# Patient Record
Sex: FEMALE | Race: BLACK OR AFRICAN AMERICAN | NOT HISPANIC OR LATINO | Employment: OTHER | URBAN - METROPOLITAN AREA
[De-identification: names, ages, dates, MRNs, and addresses within clinical notes are randomized per-mention and may not be internally consistent; named-entity substitution may affect disease eponyms.]

---

## 2023-04-20 LAB
ALANINE AMINOTRANSFERASE (SGPT) (U/L) IN SER/PLAS: 13 U/L (ref 7–45)
ALBUMIN (G/DL) IN SER/PLAS: 3.9 G/DL (ref 3.4–5)
ALKALINE PHOSPHATASE (U/L) IN SER/PLAS: 78 U/L (ref 33–136)
ANION GAP IN SER/PLAS: 9 MMOL/L (ref 10–20)
ASPARTATE AMINOTRANSFERASE (SGOT) (U/L) IN SER/PLAS: 11 U/L (ref 9–39)
BASOPHILS (10*3/UL) IN BLOOD BY AUTOMATED COUNT: 0.03 X10E9/L (ref 0–0.1)
BASOPHILS/100 LEUKOCYTES IN BLOOD BY AUTOMATED COUNT: 0.8 % (ref 0–2)
BILIRUBIN TOTAL (MG/DL) IN SER/PLAS: 0.5 MG/DL (ref 0–1.2)
C REACTIVE PROTEIN (MG/L) IN SER/PLAS: 0.62 MG/DL
CALCIUM (MG/DL) IN SER/PLAS: 9.9 MG/DL (ref 8.6–10.6)
CARBON DIOXIDE, TOTAL (MMOL/L) IN SER/PLAS: 28 MMOL/L (ref 21–32)
CHLORIDE (MMOL/L) IN SER/PLAS: 108 MMOL/L (ref 98–107)
CREATININE (MG/DL) IN SER/PLAS: 0.58 MG/DL (ref 0.5–1.05)
EOSINOPHILS (10*3/UL) IN BLOOD BY AUTOMATED COUNT: 0.18 X10E9/L (ref 0–0.7)
EOSINOPHILS/100 LEUKOCYTES IN BLOOD BY AUTOMATED COUNT: 4.8 % (ref 0–6)
ERYTHROCYTE DISTRIBUTION WIDTH (RATIO) BY AUTOMATED COUNT: 14.8 % (ref 11.5–14.5)
ERYTHROCYTE MEAN CORPUSCULAR HEMOGLOBIN CONCENTRATION (G/DL) BY AUTOMATED: 32.3 G/DL (ref 32–36)
ERYTHROCYTE MEAN CORPUSCULAR VOLUME (FL) BY AUTOMATED COUNT: 91 FL (ref 80–100)
ERYTHROCYTES (10*6/UL) IN BLOOD BY AUTOMATED COUNT: 4.78 X10E12/L (ref 4–5.2)
GFR FEMALE: >90 ML/MIN/1.73M2
GLUCOSE (MG/DL) IN SER/PLAS: 81 MG/DL (ref 74–99)
HEMATOCRIT (%) IN BLOOD BY AUTOMATED COUNT: 43.6 % (ref 36–46)
HEMOGLOBIN (G/DL) IN BLOOD: 14.1 G/DL (ref 12–16)
IMMATURE GRANULOCYTES/100 LEUKOCYTES IN BLOOD BY AUTOMATED COUNT: 0.3 % (ref 0–0.9)
LEUKOCYTES (10*3/UL) IN BLOOD BY AUTOMATED COUNT: 3.8 X10E9/L (ref 4.4–11.3)
LYMPHOCYTES (10*3/UL) IN BLOOD BY AUTOMATED COUNT: 1.13 X10E9/L (ref 1.2–4.8)
LYMPHOCYTES/100 LEUKOCYTES IN BLOOD BY AUTOMATED COUNT: 30.1 % (ref 13–44)
MONOCYTES (10*3/UL) IN BLOOD BY AUTOMATED COUNT: 0.36 X10E9/L (ref 0.1–1)
MONOCYTES/100 LEUKOCYTES IN BLOOD BY AUTOMATED COUNT: 9.6 % (ref 2–10)
NEUTROPHILS (10*3/UL) IN BLOOD BY AUTOMATED COUNT: 2.05 X10E9/L (ref 1.2–7.7)
NEUTROPHILS/100 LEUKOCYTES IN BLOOD BY AUTOMATED COUNT: 54.4 % (ref 40–80)
NRBC (PER 100 WBCS) BY AUTOMATED COUNT: 0 /100 WBC (ref 0–0)
PLATELETS (10*3/UL) IN BLOOD AUTOMATED COUNT: 221 X10E9/L (ref 150–450)
POTASSIUM (MMOL/L) IN SER/PLAS: 3.9 MMOL/L (ref 3.5–5.3)
PROTEIN TOTAL: 6.7 G/DL (ref 6.4–8.2)
SODIUM (MMOL/L) IN SER/PLAS: 141 MMOL/L (ref 136–145)
UREA NITROGEN (MG/DL) IN SER/PLAS: 13 MG/DL (ref 6–23)

## 2023-07-03 LAB
ALANINE AMINOTRANSFERASE (SGPT) (U/L) IN SER/PLAS: 9 U/L (ref 7–45)
ALBUMIN (G/DL) IN SER/PLAS: 3.9 G/DL (ref 3.4–5)
ALKALINE PHOSPHATASE (U/L) IN SER/PLAS: 68 U/L (ref 33–136)
ANION GAP IN SER/PLAS: 10 MMOL/L (ref 10–20)
ASPARTATE AMINOTRANSFERASE (SGOT) (U/L) IN SER/PLAS: 9 U/L (ref 9–39)
BASOPHILS (10*3/UL) IN BLOOD BY AUTOMATED COUNT: 0.03 X10E9/L (ref 0–0.1)
BASOPHILS/100 LEUKOCYTES IN BLOOD BY AUTOMATED COUNT: 0.8 % (ref 0–2)
BILIRUBIN TOTAL (MG/DL) IN SER/PLAS: 0.5 MG/DL (ref 0–1.2)
C REACTIVE PROTEIN (MG/L) IN SER/PLAS: 0.23 MG/DL
CALCIUM (MG/DL) IN SER/PLAS: 9.7 MG/DL (ref 8.6–10.6)
CARBON DIOXIDE, TOTAL (MMOL/L) IN SER/PLAS: 26 MMOL/L (ref 21–32)
CHLORIDE (MMOL/L) IN SER/PLAS: 110 MMOL/L (ref 98–107)
CREATININE (MG/DL) IN SER/PLAS: 0.58 MG/DL (ref 0.5–1.05)
EOSINOPHILS (10*3/UL) IN BLOOD BY AUTOMATED COUNT: 0.2 X10E9/L (ref 0–0.7)
EOSINOPHILS/100 LEUKOCYTES IN BLOOD BY AUTOMATED COUNT: 5.5 % (ref 0–6)
ERYTHROCYTE DISTRIBUTION WIDTH (RATIO) BY AUTOMATED COUNT: 14.2 % (ref 11.5–14.5)
ERYTHROCYTE MEAN CORPUSCULAR HEMOGLOBIN CONCENTRATION (G/DL) BY AUTOMATED: 32.5 G/DL (ref 32–36)
ERYTHROCYTE MEAN CORPUSCULAR VOLUME (FL) BY AUTOMATED COUNT: 90 FL (ref 80–100)
ERYTHROCYTES (10*6/UL) IN BLOOD BY AUTOMATED COUNT: 4.67 X10E12/L (ref 4–5.2)
GFR FEMALE: >90 ML/MIN/1.73M2
GLUCOSE (MG/DL) IN SER/PLAS: 88 MG/DL (ref 74–99)
HEMATOCRIT (%) IN BLOOD BY AUTOMATED COUNT: 42.2 % (ref 36–46)
HEMOGLOBIN (G/DL) IN BLOOD: 13.7 G/DL (ref 12–16)
IMMATURE GRANULOCYTES/100 LEUKOCYTES IN BLOOD BY AUTOMATED COUNT: 0.3 % (ref 0–0.9)
LEUKOCYTES (10*3/UL) IN BLOOD BY AUTOMATED COUNT: 3.6 X10E9/L (ref 4.4–11.3)
LYMPHOCYTES (10*3/UL) IN BLOOD BY AUTOMATED COUNT: 1.28 X10E9/L (ref 1.2–4.8)
LYMPHOCYTES/100 LEUKOCYTES IN BLOOD BY AUTOMATED COUNT: 35.2 % (ref 13–44)
MONOCYTES (10*3/UL) IN BLOOD BY AUTOMATED COUNT: 0.43 X10E9/L (ref 0.1–1)
MONOCYTES/100 LEUKOCYTES IN BLOOD BY AUTOMATED COUNT: 11.8 % (ref 2–10)
NEUTROPHILS (10*3/UL) IN BLOOD BY AUTOMATED COUNT: 1.69 X10E9/L (ref 1.2–7.7)
NEUTROPHILS/100 LEUKOCYTES IN BLOOD BY AUTOMATED COUNT: 46.4 % (ref 40–80)
NRBC (PER 100 WBCS) BY AUTOMATED COUNT: 0 /100 WBC (ref 0–0)
PLATELETS (10*3/UL) IN BLOOD AUTOMATED COUNT: 204 X10E9/L (ref 150–450)
POTASSIUM (MMOL/L) IN SER/PLAS: 3.8 MMOL/L (ref 3.5–5.3)
PROTEIN TOTAL: 6.6 G/DL (ref 6.4–8.2)
SODIUM (MMOL/L) IN SER/PLAS: 142 MMOL/L (ref 136–145)
UREA NITROGEN (MG/DL) IN SER/PLAS: 12 MG/DL (ref 6–23)

## 2023-07-15 LAB
CHOLESTEROL (MG/DL) IN SER/PLAS: 152 MG/DL (ref 0–199)
CHOLESTEROL IN HDL (MG/DL) IN SER/PLAS: 76.8 MG/DL
CHOLESTEROL/HDL RATIO: 2
LDL: 66 MG/DL (ref 0–99)
TRIGLYCERIDE (MG/DL) IN SER/PLAS: 48 MG/DL (ref 0–149)
VLDL: 10 MG/DL (ref 0–40)

## 2023-09-08 LAB
ALANINE AMINOTRANSFERASE (SGPT) (U/L) IN SER/PLAS: 10 U/L (ref 7–45)
ALBUMIN (G/DL) IN SER/PLAS: 3.8 G/DL (ref 3.4–5)
ALKALINE PHOSPHATASE (U/L) IN SER/PLAS: 65 U/L (ref 33–136)
ANION GAP IN SER/PLAS: 12 MMOL/L (ref 10–20)
ASPARTATE AMINOTRANSFERASE (SGOT) (U/L) IN SER/PLAS: 11 U/L (ref 9–39)
BASOPHILS (10*3/UL) IN BLOOD BY AUTOMATED COUNT: 0.04 X10E9/L (ref 0–0.1)
BASOPHILS/100 LEUKOCYTES IN BLOOD BY AUTOMATED COUNT: 1 % (ref 0–2)
BILIRUBIN TOTAL (MG/DL) IN SER/PLAS: 0.5 MG/DL (ref 0–1.2)
C REACTIVE PROTEIN (MG/L) IN SER/PLAS: 0.4 MG/DL
CALCIUM (MG/DL) IN SER/PLAS: 9.5 MG/DL (ref 8.6–10.6)
CARBON DIOXIDE, TOTAL (MMOL/L) IN SER/PLAS: 25 MMOL/L (ref 21–32)
CHLORIDE (MMOL/L) IN SER/PLAS: 109 MMOL/L (ref 98–107)
CREATININE (MG/DL) IN SER/PLAS: 0.52 MG/DL (ref 0.5–1.05)
EOSINOPHILS (10*3/UL) IN BLOOD BY AUTOMATED COUNT: 0.2 X10E9/L (ref 0–0.7)
EOSINOPHILS/100 LEUKOCYTES IN BLOOD BY AUTOMATED COUNT: 4.9 % (ref 0–6)
ERYTHROCYTE DISTRIBUTION WIDTH (RATIO) BY AUTOMATED COUNT: 14.5 % (ref 11.5–14.5)
ERYTHROCYTE MEAN CORPUSCULAR HEMOGLOBIN CONCENTRATION (G/DL) BY AUTOMATED: 32 G/DL (ref 32–36)
ERYTHROCYTE MEAN CORPUSCULAR VOLUME (FL) BY AUTOMATED COUNT: 90 FL (ref 80–100)
ERYTHROCYTES (10*6/UL) IN BLOOD BY AUTOMATED COUNT: 4.82 X10E12/L (ref 4–5.2)
GFR FEMALE: >90 ML/MIN/1.73M2
GLUCOSE (MG/DL) IN SER/PLAS: 80 MG/DL (ref 74–99)
HEMATOCRIT (%) IN BLOOD BY AUTOMATED COUNT: 43.5 % (ref 36–46)
HEMOGLOBIN (G/DL) IN BLOOD: 13.9 G/DL (ref 12–16)
IMMATURE GRANULOCYTES/100 LEUKOCYTES IN BLOOD BY AUTOMATED COUNT: 0.2 % (ref 0–0.9)
LEUKOCYTES (10*3/UL) IN BLOOD BY AUTOMATED COUNT: 4.1 X10E9/L (ref 4.4–11.3)
LYMPHOCYTES (10*3/UL) IN BLOOD BY AUTOMATED COUNT: 1.1 X10E9/L (ref 1.2–4.8)
LYMPHOCYTES/100 LEUKOCYTES IN BLOOD BY AUTOMATED COUNT: 27.1 % (ref 13–44)
MONOCYTES (10*3/UL) IN BLOOD BY AUTOMATED COUNT: 0.41 X10E9/L (ref 0.1–1)
MONOCYTES/100 LEUKOCYTES IN BLOOD BY AUTOMATED COUNT: 10.1 % (ref 2–10)
NEUTROPHILS (10*3/UL) IN BLOOD BY AUTOMATED COUNT: 2.3 X10E9/L (ref 1.2–7.7)
NEUTROPHILS/100 LEUKOCYTES IN BLOOD BY AUTOMATED COUNT: 56.7 % (ref 40–80)
NRBC (PER 100 WBCS) BY AUTOMATED COUNT: 0 /100 WBC (ref 0–0)
PLATELETS (10*3/UL) IN BLOOD AUTOMATED COUNT: 219 X10E9/L (ref 150–450)
POTASSIUM (MMOL/L) IN SER/PLAS: 4 MMOL/L (ref 3.5–5.3)
PROTEIN TOTAL: 6.5 G/DL (ref 6.4–8.2)
SODIUM (MMOL/L) IN SER/PLAS: 142 MMOL/L (ref 136–145)
UREA NITROGEN (MG/DL) IN SER/PLAS: 14 MG/DL (ref 6–23)

## 2023-11-20 ENCOUNTER — TELEPHONE (OUTPATIENT)
Dept: RHEUMATOLOGY | Facility: CLINIC | Age: 68
End: 2023-11-20
Payer: MEDICARE

## 2023-11-20 NOTE — TELEPHONE ENCOUNTER
Patient states she is having side effects to the Xeljanz and hasn't taken the medication in two weeks. She states she wasn't feeling well on the medication. Please call her at [phone].

## 2023-11-22 NOTE — TELEPHONE ENCOUNTER
Called patient a couple doses after taking the samples of Xelijenz 11mg for her to twice weekly onset of headaches at night initial potential side effect from Xelijenz? Along with intermittent medium dull muscles pains in arms and hands. Then feeling off balance.   She was still get the headaches and they later progressed to constant while she was taking. During the time frame when she was off Xeljanz no headaches and other complaints went away.     She stopped the xeljenz 2 weeks ago then on her own resume it yesterday. Today she woke up this morning with a headache.  Feels like the headaches/intermittent muscle arm/pains are about the same still. Denies n/v/d/gerd. Denies stiff neck/jaw pain. Denies ulcers and no rashes presently. She did have a rash previously when she was taking prednisone 5 days out of the week she endorsed.   Denies s/s stroke; heart attack. Paged Dr. Valverde. She is stop xelijenz and continue to the prednisone 5mg for 2 days out of the week. Discuss further at upcoming fuv in December.

## 2023-12-01 ENCOUNTER — LAB (OUTPATIENT)
Dept: LAB | Facility: LAB | Age: 68
End: 2023-12-01
Payer: MEDICARE

## 2023-12-01 DIAGNOSIS — M35.1 OTHER OVERLAP SYNDROMES (MULTI): Primary | ICD-10-CM

## 2023-12-01 LAB
ALBUMIN SERPL BCP-MCNC: 4 G/DL (ref 3.4–5)
ALP SERPL-CCNC: 65 U/L (ref 33–136)
ALT SERPL W P-5'-P-CCNC: 13 U/L (ref 7–45)
ANION GAP SERPL CALC-SCNC: 11 MMOL/L (ref 10–20)
AST SERPL W P-5'-P-CCNC: 11 U/L (ref 9–39)
BASOPHILS # BLD AUTO: 0.04 X10*3/UL (ref 0–0.1)
BASOPHILS NFR BLD AUTO: 1 %
BILIRUB SERPL-MCNC: 0.6 MG/DL (ref 0–1.2)
BUN SERPL-MCNC: 13 MG/DL (ref 6–23)
CALCIUM SERPL-MCNC: 9.8 MG/DL (ref 8.6–10.6)
CHLORIDE SERPL-SCNC: 106 MMOL/L (ref 98–107)
CO2 SERPL-SCNC: 27 MMOL/L (ref 21–32)
CREAT SERPL-MCNC: 0.55 MG/DL (ref 0.5–1.05)
CRP SERPL-MCNC: 0.65 MG/DL
EOSINOPHIL # BLD AUTO: 0.18 X10*3/UL (ref 0–0.7)
EOSINOPHIL NFR BLD AUTO: 4.3 %
ERYTHROCYTE [DISTWIDTH] IN BLOOD BY AUTOMATED COUNT: 14.6 % (ref 11.5–14.5)
GFR SERPL CREATININE-BSD FRML MDRD: >90 ML/MIN/1.73M*2
GLUCOSE SERPL-MCNC: 79 MG/DL (ref 74–99)
HCT VFR BLD AUTO: 42.5 % (ref 36–46)
HGB BLD-MCNC: 13.9 G/DL (ref 12–16)
IMM GRANULOCYTES # BLD AUTO: 0.01 X10*3/UL (ref 0–0.7)
IMM GRANULOCYTES NFR BLD AUTO: 0.2 % (ref 0–0.9)
LYMPHOCYTES # BLD AUTO: 1.2 X10*3/UL (ref 1.2–4.8)
LYMPHOCYTES NFR BLD AUTO: 28.8 %
MCH RBC QN AUTO: 29.8 PG (ref 26–34)
MCHC RBC AUTO-ENTMCNC: 32.7 G/DL (ref 32–36)
MCV RBC AUTO: 91 FL (ref 80–100)
MONOCYTES # BLD AUTO: 0.53 X10*3/UL (ref 0.1–1)
MONOCYTES NFR BLD AUTO: 12.7 %
NEUTROPHILS # BLD AUTO: 2.21 X10*3/UL (ref 1.2–7.7)
NEUTROPHILS NFR BLD AUTO: 53 %
NRBC BLD-RTO: 0 /100 WBCS (ref 0–0)
PLATELET # BLD AUTO: 244 X10*3/UL (ref 150–450)
POTASSIUM SERPL-SCNC: 4 MMOL/L (ref 3.5–5.3)
PROT SERPL-MCNC: 6.8 G/DL (ref 6.4–8.2)
RBC # BLD AUTO: 4.66 X10*6/UL (ref 4–5.2)
SODIUM SERPL-SCNC: 140 MMOL/L (ref 136–145)
WBC # BLD AUTO: 4.2 X10*3/UL (ref 4.4–11.3)

## 2023-12-01 PROCEDURE — 80053 COMPREHEN METABOLIC PANEL: CPT

## 2023-12-01 PROCEDURE — 85025 COMPLETE CBC W/AUTO DIFF WBC: CPT

## 2023-12-01 PROCEDURE — 86140 C-REACTIVE PROTEIN: CPT

## 2023-12-01 PROCEDURE — 36415 COLL VENOUS BLD VENIPUNCTURE: CPT

## 2023-12-14 ENCOUNTER — OFFICE VISIT (OUTPATIENT)
Dept: RHEUMATOLOGY | Facility: CLINIC | Age: 68
End: 2023-12-14
Payer: MEDICARE

## 2023-12-14 VITALS
HEIGHT: 67 IN | SYSTOLIC BLOOD PRESSURE: 121 MMHG | BODY MASS INDEX: 21.19 KG/M2 | HEART RATE: 65 BPM | DIASTOLIC BLOOD PRESSURE: 77 MMHG | WEIGHT: 135 LBS

## 2023-12-14 DIAGNOSIS — M06.049 RHEUMATOID ARTHRITIS INVOLVING HAND WITH NEGATIVE RHEUMATOID FACTOR, UNSPECIFIED LATERALITY (MULTI): Primary | ICD-10-CM

## 2023-12-14 PROCEDURE — 1126F AMNT PAIN NOTED NONE PRSNT: CPT | Performed by: INTERNAL MEDICINE

## 2023-12-14 PROCEDURE — 99213 OFFICE O/P EST LOW 20 MIN: CPT | Performed by: INTERNAL MEDICINE

## 2023-12-14 PROCEDURE — 1159F MED LIST DOCD IN RCRD: CPT | Performed by: INTERNAL MEDICINE

## 2023-12-14 RX ORDER — ACETAMINOPHEN 500 MG
1 TABLET ORAL EVERY 24 HOURS
COMMUNITY

## 2023-12-14 RX ORDER — PREDNISONE 5 MG/1
5 TABLET ORAL 2 TIMES DAILY
COMMUNITY
End: 2024-03-21 | Stop reason: SDUPTHER

## 2023-12-14 RX ORDER — CHOLECALCIFEROL (VITAMIN D3) 25 MCG
1000 TABLET ORAL DAILY
COMMUNITY

## 2023-12-14 RX ORDER — CARBOXYMETHYLCELLULOSE SODIUM 10 MG/ML
2 GEL OPHTHALMIC 3 TIMES DAILY PRN
COMMUNITY

## 2023-12-14 RX ORDER — ATORVASTATIN CALCIUM 10 MG/1
1 TABLET, FILM COATED ORAL DAILY
COMMUNITY
Start: 2022-08-08

## 2023-12-14 ASSESSMENT — ENCOUNTER SYMPTOMS
OCCASIONAL FEELINGS OF UNSTEADINESS: 0
LOSS OF SENSATION IN FEET: 0
DEPRESSION: 0

## 2023-12-14 ASSESSMENT — PAIN SCALES - GENERAL: PAINLEVEL: 0-NO PAIN

## 2023-12-15 NOTE — PROGRESS NOTES
Informants: Patient and EMR.  PP: 68-year-old female with history of mixed connective tissue disease, rheumatoid arthritis.  CC: Mild pain in hands.  [place]: She initially presented to rheumatology department 4/9/2012 with complaints of Raynaud's phenomena with positive GUILLERMINA and positive RNP.  She had a history of retinal degeneration.  She developed cramping pain in her hands that improved with physical therapy.  She was noted to have no signs of a scleroderma or joint swelling.  She gradually developed swelling with limited range of motion of the MCP joint of the digits of both hands as well as both wrists.  She was unable to tolerate leflunomide, minocycline, rinvoq, mycophenolate.  She was also treated with Orencia that seemed to cause exacerbation of the joint pain.  She was most recently treated with Xeljanz 5 mg twice per week in addition to prednisone 2 days/week.  She noted headache palpitations and abdominal discomfort with taking Xeljanz 11 mg once or twice per week.  She has not taken the Xeljanz for about past 1 month with resolution of headache and palpitation symptoms.  PH: Allergies: Celebrex (angioedema), naproxen (wheezing), intolerance: Fosamax (bone pain in the shins), methotrexate, Orencia, hydroxychloroquine; illnesses: Osteopenia, retinal degeneration, mixed connective tissue disease, rheumatoid arthritis.  SH: She denies any alcohol tobacco or illicit drug use.  FH: Her father had colon cancer and myocardial infarction.  Her mother has coronary artery disease.  She has a brother with retinal degeneration and neuropathy involving his face.  She has a sister with diverticulitis and rheumatoid arthritis.  She has no children.  PX: She is a thin, well-developed, well-nourished, black female.  The lungs, heart, abdomen, and extremities are benign.  The musculoskeletal examination shows slight skin tightness of the digits of both hands.  There is slight flexion contracture of the MCP joint of the  second and third digits of both hands with mild synovial thickening.  There is mild synovial thickening at the wrist with some limitation to range of motion of both wrist.  The elbows and shoulders are not swollen.  The knees are not swollen.  Laboratory (12/1/2023) CRP 0.65, WBC 4.2, hemoglobin 13.9, hematocrit 42.5, MCV 91, MCHC 32.7, platelets 244, BUN 13, creatinine 0.55, glucose 79, alkaline phosphatase 65, AST 11, ALT 13, (2/23/2012) GUILLERMINA 1: 320, RNP 2.2.  Impression: 68-year-old black female with mixed connective tissue disease with rheumatoid arthritis and mild crest features.  Plan: She is to continue with her current medications Prednisone 5 mg 2 days/week.  Try to resume Xeljanz at 5 mg twice per week.  She is to return at the next available office appointment.

## 2024-01-10 ENCOUNTER — TELEPHONE (OUTPATIENT)
Dept: RHEUMATOLOGY | Facility: CLINIC | Age: 69
End: 2024-01-10

## 2024-01-10 NOTE — PROGRESS NOTES
Per recent fuv. Pt to resume xeljanx 5mg BID. Sending to [facility] for PA/PAP.

## 2024-01-12 NOTE — TELEPHONE ENCOUNTER
Spoke to Dr. Valverde and his note is correct will have patient take Xeljanz 5mg twice per week. Sent messaging to rheumatology pharmacy team of clarification as working to get approval for PAP program. Updated pt and will call her back to confirm that samples are at the [place] office for her. Pt stated still approx 1,000/month with goodrx since she didn't get qualify for PAP last year.

## 2024-01-18 NOTE — TELEPHONE ENCOUNTER
Patient left a VM indicating that she was supposed to hear from Amira today. Wants return call today.

## 2024-02-08 ENCOUNTER — OFFICE VISIT (OUTPATIENT)
Dept: OPHTHALMOLOGY | Facility: CLINIC | Age: 69
End: 2024-02-08
Payer: MEDICARE

## 2024-02-08 DIAGNOSIS — H40.9 GLAUCOMA OF BOTH EYES, UNSPECIFIED GLAUCOMA TYPE: Primary | ICD-10-CM

## 2024-02-08 DIAGNOSIS — H33.312 HORSESHOE TEAR OF RETINA OF LEFT EYE WITHOUT DETACHMENT: ICD-10-CM

## 2024-02-08 DIAGNOSIS — H04.129 DRY EYE: ICD-10-CM

## 2024-02-08 DIAGNOSIS — H40.1131 PRIMARY OPEN ANGLE GLAUCOMA (POAG) OF BOTH EYES, MILD STAGE: ICD-10-CM

## 2024-02-08 DIAGNOSIS — H30.143 ACUTE POSTERIOR MULTIFOCAL PLACOID PIGMENT EPITHELIOPATHY, BILATERAL: ICD-10-CM

## 2024-02-08 DIAGNOSIS — H25.13 NUCLEAR SCLEROSIS OF BOTH EYES: ICD-10-CM

## 2024-02-08 PROCEDURE — 92083 EXTENDED VISUAL FIELD XM: CPT | Performed by: OPHTHALMOLOGY

## 2024-02-08 PROCEDURE — 92134 CPTRZ OPH DX IMG PST SGM RTA: CPT | Mod: BILATERAL PROCEDURE | Performed by: OPHTHALMOLOGY

## 2024-02-08 PROCEDURE — 99213 OFFICE O/P EST LOW 20 MIN: CPT | Performed by: OPHTHALMOLOGY

## 2024-02-08 ASSESSMENT — CONF VISUAL FIELD
OD_SUPERIOR_NASAL_RESTRICTION: 0
OS_SUPERIOR_NASAL_RESTRICTION: 2
OD_INFERIOR_TEMPORAL_RESTRICTION: 0
OS_SUPERIOR_TEMPORAL_RESTRICTION: 2
OD_NORMAL: 1
OS_INFERIOR_TEMPORAL_RESTRICTION: 2
OD_INFERIOR_NASAL_RESTRICTION: 0
OD_SUPERIOR_TEMPORAL_RESTRICTION: 0
OS_INFERIOR_NASAL_RESTRICTION: 2

## 2024-02-08 ASSESSMENT — VISUAL ACUITY
OD_CC: 20/20
METHOD: SNELLEN - LINEAR

## 2024-02-08 ASSESSMENT — EXTERNAL EXAM - LEFT EYE: OS_EXAM: NORMAL

## 2024-02-08 ASSESSMENT — ENCOUNTER SYMPTOMS: EYES NEGATIVE: 1

## 2024-02-08 ASSESSMENT — SLIT LAMP EXAM - LIDS
COMMENTS: GOOD POSITION
COMMENTS: GOOD POSITION

## 2024-02-08 ASSESSMENT — TONOMETRY
IOP_METHOD: GOLDMANN APPLANATION
OD_IOP_MMHG: 14
OS_IOP_MMHG: 14

## 2024-02-08 ASSESSMENT — PACHYMETRY
OS_CT(UM): 597
OD_CT(UM): 582

## 2024-02-08 ASSESSMENT — EXTERNAL EXAM - RIGHT EYE: OD_EXAM: NORMAL

## 2024-02-08 NOTE — PROGRESS NOTES
1-oag intraocular pressure (IOP) good, but oct changes. Start latanoprost ou samples given  2-ns ou  3-polo tears

## 2024-02-22 ENCOUNTER — LAB (OUTPATIENT)
Dept: LAB | Facility: LAB | Age: 69
End: 2024-02-22
Payer: MEDICARE

## 2024-02-22 DIAGNOSIS — M35.1 OTHER OVERLAP SYNDROMES (MULTI): Primary | ICD-10-CM

## 2024-02-22 LAB
ALBUMIN SERPL BCP-MCNC: 3.9 G/DL (ref 3.4–5)
ALP SERPL-CCNC: 64 U/L (ref 33–136)
ALT SERPL W P-5'-P-CCNC: 11 U/L (ref 7–45)
ANION GAP SERPL CALC-SCNC: 12 MMOL/L (ref 10–20)
AST SERPL W P-5'-P-CCNC: 8 U/L (ref 9–39)
BASOPHILS # BLD AUTO: 0.04 X10*3/UL (ref 0–0.1)
BASOPHILS NFR BLD AUTO: 1.3 %
BILIRUB SERPL-MCNC: 0.5 MG/DL (ref 0–1.2)
BUN SERPL-MCNC: 11 MG/DL (ref 6–23)
CALCIUM SERPL-MCNC: 10 MG/DL (ref 8.6–10.6)
CHLORIDE SERPL-SCNC: 107 MMOL/L (ref 98–107)
CO2 SERPL-SCNC: 26 MMOL/L (ref 21–32)
CREAT SERPL-MCNC: 0.62 MG/DL (ref 0.5–1.05)
CRP SERPL-MCNC: 0.28 MG/DL
EGFRCR SERPLBLD CKD-EPI 2021: >90 ML/MIN/1.73M*2
EOSINOPHIL # BLD AUTO: 0.2 X10*3/UL (ref 0–0.7)
EOSINOPHIL NFR BLD AUTO: 6.4 %
ERYTHROCYTE [DISTWIDTH] IN BLOOD BY AUTOMATED COUNT: 14.1 % (ref 11.5–14.5)
GLUCOSE SERPL-MCNC: 82 MG/DL (ref 74–99)
HCT VFR BLD AUTO: 43.8 % (ref 36–46)
HGB BLD-MCNC: 14.1 G/DL (ref 12–16)
IMM GRANULOCYTES # BLD AUTO: 0.01 X10*3/UL (ref 0–0.7)
IMM GRANULOCYTES NFR BLD AUTO: 0.3 % (ref 0–0.9)
LYMPHOCYTES # BLD AUTO: 0.99 X10*3/UL (ref 1.2–4.8)
LYMPHOCYTES NFR BLD AUTO: 31.8 %
MCH RBC QN AUTO: 29.1 PG (ref 26–34)
MCHC RBC AUTO-ENTMCNC: 32.2 G/DL (ref 32–36)
MCV RBC AUTO: 91 FL (ref 80–100)
MONOCYTES # BLD AUTO: 0.4 X10*3/UL (ref 0.1–1)
MONOCYTES NFR BLD AUTO: 12.9 %
NEUTROPHILS # BLD AUTO: 1.47 X10*3/UL (ref 1.2–7.7)
NEUTROPHILS NFR BLD AUTO: 47.3 %
NRBC BLD-RTO: 0 /100 WBCS (ref 0–0)
PLATELET # BLD AUTO: 215 X10*3/UL (ref 150–450)
POTASSIUM SERPL-SCNC: 4.2 MMOL/L (ref 3.5–5.3)
PROT SERPL-MCNC: 6.6 G/DL (ref 6.4–8.2)
RBC # BLD AUTO: 4.84 X10*6/UL (ref 4–5.2)
SODIUM SERPL-SCNC: 141 MMOL/L (ref 136–145)
WBC # BLD AUTO: 3.1 X10*3/UL (ref 4.4–11.3)

## 2024-02-22 PROCEDURE — 85025 COMPLETE CBC W/AUTO DIFF WBC: CPT

## 2024-02-22 PROCEDURE — 80053 COMPREHEN METABOLIC PANEL: CPT

## 2024-02-22 PROCEDURE — 86140 C-REACTIVE PROTEIN: CPT

## 2024-02-22 PROCEDURE — 36415 COLL VENOUS BLD VENIPUNCTURE: CPT

## 2024-03-21 ENCOUNTER — OFFICE VISIT (OUTPATIENT)
Dept: RHEUMATOLOGY | Facility: CLINIC | Age: 69
End: 2024-03-21
Payer: MEDICARE

## 2024-03-21 VITALS
DIASTOLIC BLOOD PRESSURE: 66 MMHG | SYSTOLIC BLOOD PRESSURE: 117 MMHG | HEIGHT: 67 IN | HEART RATE: 78 BPM | BODY MASS INDEX: 20.72 KG/M2 | WEIGHT: 132 LBS

## 2024-03-21 DIAGNOSIS — K86.2 PANCREATIC CYST (HHS-HCC): ICD-10-CM

## 2024-03-21 DIAGNOSIS — M06.049 RHEUMATOID ARTHRITIS INVOLVING HAND WITH NEGATIVE RHEUMATOID FACTOR, UNSPECIFIED LATERALITY (MULTI): Primary | ICD-10-CM

## 2024-03-21 PROCEDURE — 1126F AMNT PAIN NOTED NONE PRSNT: CPT | Performed by: INTERNAL MEDICINE

## 2024-03-21 PROCEDURE — 99214 OFFICE O/P EST MOD 30 MIN: CPT | Performed by: INTERNAL MEDICINE

## 2024-03-21 PROCEDURE — 1160F RVW MEDS BY RX/DR IN RCRD: CPT | Performed by: INTERNAL MEDICINE

## 2024-03-21 PROCEDURE — 1036F TOBACCO NON-USER: CPT | Performed by: INTERNAL MEDICINE

## 2024-03-21 PROCEDURE — 1159F MED LIST DOCD IN RCRD: CPT | Performed by: INTERNAL MEDICINE

## 2024-03-21 RX ORDER — PREDNISONE 5 MG/1
TABLET ORAL
Qty: 24 TABLET | Refills: 3 | Status: SHIPPED | OUTPATIENT
Start: 2024-03-21

## 2024-03-21 ASSESSMENT — PAIN SCALES - GENERAL: PAINLEVEL: 0-NO PAIN

## 2024-03-21 NOTE — PROGRESS NOTES
She presents for follow-up evaluation with some mild soreness hands.  She continues to take prednisone 5 mg 2 days/week and Xeljanz 5 mg 2 days/week.  She has not noted any significant stiffness.    The lungs are clear to auscultation.  The heart is regular in rhythm.  The abdomen is benign.  Extremities are without edema.  The musculoskeletal examination shows mild synovial thickening at the PIP and MCP joints of the digits of both hands with flexion contracture of the DIP joint of fifth digit of both hands.  There is mild pain on passive abduction of the shoulders.  The knees are not swollen.  There is dorsal interosseous muscle atrophy in the radial aspect of both hands, left worse than right.  The Tinel's sign is normal at both wrists.    DEXA bone density (9/14/2022) L1-L4 T-score -2.9, left femoral neck T-score -2.1, left total femur T-score -1.8, right femoral neck T-score -2.3, right total femur T-score -1.9.    Laboratory (2/22/2024) CRP 0.28, WBC 3.1, hemoglobin 14.1, hematocrit 43.8, MCV 91, MCHC 32.2, platelets 215, absolute neutrophil count 1.47, absolute lymphocyte count 0.99 BUN 11, creatinine 0.62, glucose 82, calcium 10.0, Albumin 3.9, alkaline phosphatase 69, AST 8, ALT 11.    She has rheumatoid arthritis, mixed connective tissue disease with crest features, osteoporosis, mild leukopenia likely related to medication (Xeljanz).    She is to have laboratory for follow-up of Xeljanz therapy.  She is to stop Xeljanz for 1 week prior to the next laboratory testing.  She is to have ultrasound of the abdomen to follow-up on the cyst in tail of the pancreas.  She is to return at the next available office appointment.

## 2024-04-01 ENCOUNTER — HOSPITAL ENCOUNTER (OUTPATIENT)
Dept: RADIOLOGY | Facility: CLINIC | Age: 69
Discharge: HOME | End: 2024-04-01
Payer: MEDICARE

## 2024-04-01 DIAGNOSIS — K86.2 PANCREATIC CYST (HHS-HCC): ICD-10-CM

## 2024-04-01 PROCEDURE — 76705 ECHO EXAM OF ABDOMEN: CPT

## 2024-04-01 PROCEDURE — 76705 ECHO EXAM OF ABDOMEN: CPT | Performed by: STUDENT IN AN ORGANIZED HEALTH CARE EDUCATION/TRAINING PROGRAM

## 2024-04-18 ENCOUNTER — OFFICE VISIT (OUTPATIENT)
Dept: OPHTHALMOLOGY | Facility: CLINIC | Age: 69
End: 2024-04-18
Payer: MEDICARE

## 2024-04-18 DIAGNOSIS — H40.003 GLAUCOMA SUSPECT OF BOTH EYES: Primary | ICD-10-CM

## 2024-04-18 PROCEDURE — 99212 OFFICE O/P EST SF 10 MIN: CPT | Performed by: OPHTHALMOLOGY

## 2024-04-18 RX ORDER — OMEGA-3-ACID ETHYL ESTERS 1 G/1
1 CAPSULE, LIQUID FILLED ORAL 2 TIMES DAILY
COMMUNITY

## 2024-04-18 ASSESSMENT — REFRACTION_WEARINGRX
OD_CYLINDER: -0.50
OD_AXIS: 152
OD_SPHERE: -7.75
OS_SPHERE: -6.00
OS_CYLINDER: -1.50

## 2024-04-18 ASSESSMENT — CONF VISUAL FIELD
OD_INFERIOR_TEMPORAL_RESTRICTION: 0
OS_SUPERIOR_TEMPORAL_RESTRICTION: 2
OS_INFERIOR_NASAL_RESTRICTION: 2
OD_INFERIOR_NASAL_RESTRICTION: 0
OD_NORMAL: 1
OS_SUPERIOR_NASAL_RESTRICTION: 2
OD_SUPERIOR_NASAL_RESTRICTION: 0
OS_INFERIOR_TEMPORAL_RESTRICTION: 2
OD_SUPERIOR_TEMPORAL_RESTRICTION: 0

## 2024-04-18 ASSESSMENT — ENCOUNTER SYMPTOMS
PSYCHIATRIC NEGATIVE: 0
HEMATOLOGIC/LYMPHATIC NEGATIVE: 0
ALLERGIC/IMMUNOLOGIC NEGATIVE: 0
GASTROINTESTINAL NEGATIVE: 0
RESPIRATORY NEGATIVE: 0
ENDOCRINE NEGATIVE: 0
EYES NEGATIVE: 0
CARDIOVASCULAR NEGATIVE: 0
CONSTITUTIONAL NEGATIVE: 0
NEUROLOGICAL NEGATIVE: 0
MUSCULOSKELETAL NEGATIVE: 0

## 2024-04-18 ASSESSMENT — EXTERNAL EXAM - LEFT EYE: OS_EXAM: NORMAL

## 2024-04-18 ASSESSMENT — VISUAL ACUITY
OD_CC: 20/20
CORRECTION_TYPE: GLASSES
METHOD: SNELLEN - LINEAR
OS_CC: 20/30

## 2024-04-18 ASSESSMENT — TONOMETRY
OS_IOP_MMHG: 12
IOP_METHOD: GOLDMANN APPLANATION
OD_IOP_MMHG: 12

## 2024-04-18 ASSESSMENT — EXTERNAL EXAM - RIGHT EYE: OD_EXAM: NORMAL

## 2024-04-18 ASSESSMENT — SLIT LAMP EXAM - LIDS
COMMENTS: GOOD POSITION
COMMENTS: GOOD POSITION

## 2024-04-18 ASSESSMENT — PACHYMETRY
OS_CT(UM): 597
OD_CT(UM): 582

## 2024-05-24 ENCOUNTER — LAB (OUTPATIENT)
Dept: LAB | Facility: LAB | Age: 69
End: 2024-05-24
Payer: MEDICARE

## 2024-05-24 DIAGNOSIS — M06.049 RHEUMATOID ARTHRITIS INVOLVING HAND WITH NEGATIVE RHEUMATOID FACTOR, UNSPECIFIED LATERALITY (MULTI): ICD-10-CM

## 2024-05-24 LAB
ALBUMIN SERPL BCP-MCNC: 4 G/DL (ref 3.4–5)
ALP SERPL-CCNC: 59 U/L (ref 33–136)
ALT SERPL W P-5'-P-CCNC: 11 U/L (ref 7–45)
ANION GAP SERPL CALC-SCNC: 11 MMOL/L (ref 10–20)
AST SERPL W P-5'-P-CCNC: 11 U/L (ref 9–39)
BASOPHILS # BLD AUTO: 0.05 X10*3/UL (ref 0–0.1)
BASOPHILS NFR BLD AUTO: 1.1 %
BILIRUB SERPL-MCNC: 0.6 MG/DL (ref 0–1.2)
BUN SERPL-MCNC: 12 MG/DL (ref 6–23)
CALCIUM SERPL-MCNC: 9.3 MG/DL (ref 8.6–10.6)
CHLORIDE SERPL-SCNC: 108 MMOL/L (ref 98–107)
CO2 SERPL-SCNC: 25 MMOL/L (ref 21–32)
CREAT SERPL-MCNC: 0.55 MG/DL (ref 0.5–1.05)
CRP SERPL-MCNC: 0.34 MG/DL
EGFRCR SERPLBLD CKD-EPI 2021: >90 ML/MIN/1.73M*2
EOSINOPHIL # BLD AUTO: 0.22 X10*3/UL (ref 0–0.7)
EOSINOPHIL NFR BLD AUTO: 4.8 %
ERYTHROCYTE [DISTWIDTH] IN BLOOD BY AUTOMATED COUNT: 14.4 % (ref 11.5–14.5)
GLUCOSE SERPL-MCNC: 88 MG/DL (ref 74–99)
HCT VFR BLD AUTO: 43.9 % (ref 36–46)
HGB BLD-MCNC: 13.9 G/DL (ref 12–16)
IMM GRANULOCYTES # BLD AUTO: 0.01 X10*3/UL (ref 0–0.7)
IMM GRANULOCYTES NFR BLD AUTO: 0.2 % (ref 0–0.9)
LYMPHOCYTES # BLD AUTO: 1.01 X10*3/UL (ref 1.2–4.8)
LYMPHOCYTES NFR BLD AUTO: 22.1 %
MCH RBC QN AUTO: 28.8 PG (ref 26–34)
MCHC RBC AUTO-ENTMCNC: 31.7 G/DL (ref 32–36)
MCV RBC AUTO: 91 FL (ref 80–100)
MONOCYTES # BLD AUTO: 0.48 X10*3/UL (ref 0.1–1)
MONOCYTES NFR BLD AUTO: 10.5 %
NEUTROPHILS # BLD AUTO: 2.8 X10*3/UL (ref 1.2–7.7)
NEUTROPHILS NFR BLD AUTO: 61.3 %
NRBC BLD-RTO: 0 /100 WBCS (ref 0–0)
PLATELET # BLD AUTO: 214 X10*3/UL (ref 150–450)
POTASSIUM SERPL-SCNC: 3.8 MMOL/L (ref 3.5–5.3)
PROT SERPL-MCNC: 6.6 G/DL (ref 6.4–8.2)
RBC # BLD AUTO: 4.83 X10*6/UL (ref 4–5.2)
SODIUM SERPL-SCNC: 140 MMOL/L (ref 136–145)
WBC # BLD AUTO: 4.6 X10*3/UL (ref 4.4–11.3)

## 2024-05-24 PROCEDURE — 36415 COLL VENOUS BLD VENIPUNCTURE: CPT

## 2024-05-24 PROCEDURE — 80053 COMPREHEN METABOLIC PANEL: CPT

## 2024-05-24 PROCEDURE — 86140 C-REACTIVE PROTEIN: CPT

## 2024-05-24 PROCEDURE — 85025 COMPLETE CBC W/AUTO DIFF WBC: CPT

## 2024-07-16 ENCOUNTER — LAB (OUTPATIENT)
Dept: LAB | Facility: LAB | Age: 69
End: 2024-07-16
Payer: MEDICARE

## 2024-07-16 ENCOUNTER — OFFICE VISIT (OUTPATIENT)
Dept: CARDIOLOGY | Facility: HOSPITAL | Age: 69
End: 2024-07-16
Payer: MEDICARE

## 2024-07-16 VITALS
BODY MASS INDEX: 19.88 KG/M2 | HEART RATE: 61 BPM | SYSTOLIC BLOOD PRESSURE: 117 MMHG | DIASTOLIC BLOOD PRESSURE: 75 MMHG | WEIGHT: 131.2 LBS | HEIGHT: 68 IN

## 2024-07-16 DIAGNOSIS — E78.00 PURE HYPERCHOLESTEROLEMIA: ICD-10-CM

## 2024-07-16 LAB
CHOLEST SERPL-MCNC: 153 MG/DL (ref 0–199)
CHOLESTEROL/HDL RATIO: 1.8
HDLC SERPL-MCNC: 82.9 MG/DL
LDLC SERPL CALC-MCNC: 60 MG/DL
NON HDL CHOLESTEROL: 70 MG/DL (ref 0–149)
TRIGL SERPL-MCNC: 51 MG/DL (ref 0–149)
VLDL: 10 MG/DL (ref 0–40)

## 2024-07-16 PROCEDURE — 80061 LIPID PANEL: CPT

## 2024-07-16 PROCEDURE — 1160F RVW MEDS BY RX/DR IN RCRD: CPT | Performed by: INTERNAL MEDICINE

## 2024-07-16 PROCEDURE — 93010 ELECTROCARDIOGRAM REPORT: CPT | Performed by: INTERNAL MEDICINE

## 2024-07-16 PROCEDURE — 93005 ELECTROCARDIOGRAM TRACING: CPT | Performed by: INTERNAL MEDICINE

## 2024-07-16 PROCEDURE — 99214 OFFICE O/P EST MOD 30 MIN: CPT | Mod: 25 | Performed by: INTERNAL MEDICINE

## 2024-07-16 PROCEDURE — 36415 COLL VENOUS BLD VENIPUNCTURE: CPT

## 2024-07-16 PROCEDURE — 99214 OFFICE O/P EST MOD 30 MIN: CPT | Performed by: INTERNAL MEDICINE

## 2024-07-16 PROCEDURE — 1159F MED LIST DOCD IN RCRD: CPT | Performed by: INTERNAL MEDICINE

## 2024-07-16 PROCEDURE — 1036F TOBACCO NON-USER: CPT | Performed by: INTERNAL MEDICINE

## 2024-07-16 RX ORDER — HYPROMELLOSE 0 G/G
1 GEL OPHTHALMIC AS NEEDED
COMMUNITY

## 2024-07-16 ASSESSMENT — PATIENT HEALTH QUESTIONNAIRE - PHQ9
1. LITTLE INTEREST OR PLEASURE IN DOING THINGS: NOT AT ALL
SUM OF ALL RESPONSES TO PHQ9 QUESTIONS 1 & 2: 0
2. FEELING DOWN, DEPRESSED OR HOPELESS: NOT AT ALL

## 2024-07-16 ASSESSMENT — ENCOUNTER SYMPTOMS
DEPRESSION: 0
OCCASIONAL FEELINGS OF UNSTEADINESS: 0
LOSS OF SENSATION IN FEET: 0

## 2024-07-16 NOTE — PROGRESS NOTES
Primary Care Physician: Danny Fraser MD  Date of Visit: 07/16/2024 11:00 AM EDT  Location of visit: [facility]     Chief Complaint:   Chief Complaint   Patient presents with    Follow-up     1 year        HPI / Summary:   Trang Ramos is a 69 y.o. female presents for followup.  She has no cardiac complaints.  She is generally active and participates in chair yoga and lalo chi without chest pain or shortness of breath.  The patient denies chest pain, shortness of breath, palpitations, lightheadedness, syncope, orthopnea, paroxysmal nocturnal dyspnea, lower extremity edema, or bleeding problems.    She never started taking atorvastatin secondary to the concern for side effects.  She has never been on a statin.          Past Medical History:   Diagnosis Date    Dry eyes     Glaucoma suspect of both eyes     Nuclear sclerotic cataract of right eye     Personal history of other diseases of the musculoskeletal system and connective tissue 10/28/2013    History of connective tissue disease    Pseudophakia     Retinal detachment 01/01/2012    Retinal tear OS        Past Surgical History:   Procedure Laterality Date    CATARACT EXTRACTION      PC IOL OU    OTHER SURGICAL HISTORY  04/22/2019    Colonoscopy    RETINAL DETACHMENT SURGERY Left 01/01/2012    Retinal tear OS in 2012          Social History:   reports that she has never smoked. She has been exposed to tobacco smoke. She has never used smokeless tobacco. She reports that she does not drink alcohol and does not use drugs.     Family History:  family history includes No Known Problems in her father and mother.      Allergies:  Allergies   Allergen Reactions    Celecoxib Anaphylaxis and Swelling    Alendronic Acid GI Upset, Itching and Other     leg pain    Codeine GI Upset    Naproxen Swelling    Sulfa (Sulfonamide Antibiotics) Swelling    Leflunomide Dizziness    Hydroxychloroquine Rash    Minocycline Rash and Swelling       Outpatient Medications:  Current  "Outpatient Medications   Medication Instructions    atorvastatin (Lipitor) 10 mg tablet 1 tablet, oral, Daily    calcium carbonate-vitamin D3 (Caltrate with Vitamin D3) 600 mg-20 mcg (800 unit) tablet 1 tablet, oral, Every 24 hours    carboxymethylcellulose (Refresh Celluvisc) 1 % ophthalmic solution dropperette 2 drops, Both Eyes, 3 times daily PRN    cholecalciferol (VITAMIN D-3) 1,000 Units, oral, Daily    hypromellose (GenTeal Tears Severe Gel) 0.3 % opthalmic gel 1 drop, Both Eyes, As needed, Uses Mostly at night     multivitamin-Ca-iron-minerals (Tab-A-Jair Womens) 27-0.4 mg tablet 1 tablet, Daily    omega-3 acid ethyl esters (LOVAZA) 1 g, oral, 2 times daily    predniSONE (Deltasone) 5 mg tablet 1 tablet orally twice per week.       Physical Exam:  Vitals:    07/16/24 1103   BP: 117/75   BP Location: Left arm   Patient Position: Sitting   BP Cuff Size: Adult   Pulse: 61   Weight: 59.5 kg (131 lb 3.2 oz)   Height: 1.715 m (5' 7.5\")     Wt Readings from Last 5 Encounters:   07/16/24 59.5 kg (131 lb 3.2 oz)   03/21/24 59.9 kg (132 lb)   12/14/23 61.2 kg (135 lb)   07/11/23 60.8 kg (134 lb)   05/18/23 60.1 kg (132 lb 9.6 oz)     Body mass index is 20.25 kg/m².   General: Well-developed well-nourished in no acute distress  HEENT: Normocephalic atraumatic  Neck: Supple, JVP is normal negative hepatojugular reflux 2+ carotid pulses without bruit  Pulmonary: Normal respiratory effort, clear to auscultation  Cardiovascular: No right ventricular heave, normal S1 and S2, no murmurs rubs or gallops  Abdomen: Soft nontender nondistended  Extremities: Warm without edema 2+ radial pulses bilaterally 2+ posterior tibial pulses bilaterally  Neurologic: Alert and oriented x3  Psychiatric: Normal mood and affect     Last Labs:  CMP:  Recent Labs     05/24/24  0843 02/22/24  0906 12/01/23  0915    141 140   K 3.8 4.2 4.0   * 107 106   CO2 25 26 27   ANIONGAP 11 12 11   BUN 12 11 13   CREATININE 0.55 0.62 0.55   EGFR " ">90 >90 >90   GLUCOSE 88 82 79     Recent Labs     05/24/24  0843 02/22/24  0906 12/01/23  0915   ALBUMIN 4.0 3.9 4.0   ALKPHOS 59 64 65   ALT 11 11 13   AST 11 8* 11   BILITOT 0.6 0.5 0.6     CBC:  Recent Labs     05/24/24  0843 02/22/24  0906 12/01/23  0915   WBC 4.6 3.1* 4.2*   HGB 13.9 14.1 13.9   HCT 43.9 43.8 42.5    215 244   MCV 91 91 91     COAG: No results for input(s): \"INR\", \"DDIMERVTE\" in the last 95556 hours.  HEME/ENDO:  Recent Labs     08/30/18  1757   TSH 1.24      CARDIAC: No results for input(s): \"LDH\", \"CKMB\", \"TROPHS\", \"BNP\" in the last 72006 hours.    No lab exists for component: \"CK\", \"CKMBP\"  Recent Labs     07/15/23  1000 07/05/22  0932   CHOL 152 154   LDLF 66 63   HDL 76.8 81.8   TRIG 48 46       Last Cardiology Tests:  ECG:  An electrocardiogram performed today that I reviewed shows normal sinus rhythm with sinus arrhythmia.    Echo:  Echocardiogram March 13, 2019  CONCLUSIONS:   1. The left ventricular systolic function is normal with a 60-65% estimated ejection fraction.       Cath:      Stress Test:  Stress Results:  No results found for this or any previous visit from the past 365 days.         Cardiac Imaging:  CT chest without contrast July 31, 2019  HEART AND VESSELS:  The thoracic aorta is of normal course and caliber without vascular  calcifications.     Main pulmonary artery and its branches are normal in caliber.     No coronary artery calcifications are seen. The study is not  optimized for evaluation of coronary arteries.     The cardiac chambers are not enlarged.     No evidence of pericardial effusion.      Assessment/Plan   Diagnoses and all orders for this visit:  Pure hypercholesterolemia  -     ECG 12 Lead  -     Lipid panel; Future  -     CT cardiac scoring wo IV contrast; Future    In summary Ms. Ramos is a pleasant 69 year-old -American female with a past medical history significant for rheumatoid arthritis and hyperlipidemia.  She is asymptomatic from " a cardiac perspective.  Given her history of rheumatoid arthritis I did recommend a statin.  He is hesitant because she is concerned about side.  I did order a CT cardiac scoring further risk stratification.  I also ordered a fasting profile.  We will see her back in follow-up in 1 year.      Orders:  Orders Placed This Encounter   Procedures    CT cardiac scoring wo IV contrast    Lipid panel    ECG 12 Lead      Followup Appts:  Future Appointments   Date Time Provider Department Center   8/8/2024  2:00 PM Victoriano Valverde MD [id number] Academic   1/9/2025  9:30 AM Steve Mac MD [id number] Academic           ____________________________________________________________  Maurice Sahni MD  [facility] [place]  [facility]

## 2024-07-16 NOTE — PATIENT INSTRUCTIONS
Check CT cardiac scoring.  Check a fasting lipid profile.  Consider taking a cholesterol medication.  Follow-up in 1 year.

## 2024-07-17 LAB
ATRIAL RATE: 61 BPM
P AXIS: 62 DEGREES
P OFFSET: 211 MS
P ONSET: 153 MS
PR INTERVAL: 142 MS
Q ONSET: 224 MS
QRS COUNT: 10 BEATS
QRS DURATION: 82 MS
QT INTERVAL: 410 MS
QTC CALCULATION(BAZETT): 412 MS
QTC FREDERICIA: 412 MS
R AXIS: 42 DEGREES
T AXIS: 43 DEGREES
T OFFSET: 429 MS
VENTRICULAR RATE: 61 BPM

## 2024-07-19 ENCOUNTER — TELEPHONE (OUTPATIENT)
Dept: CARDIOLOGY | Facility: HOSPITAL | Age: 69
End: 2024-07-19
Payer: MEDICARE

## 2024-07-19 NOTE — TELEPHONE ENCOUNTER
Notified pt. Of lab results.  Pt. Hasn't been called to schedule CT cardiac score yet, but understands statin therapy will be decided based on that result.       ----- Message from Maurice Sahni sent at 7/16/2024  5:56 PM EDT -----  Lipid profile is okay.  Await CT cardiac scoring to determine if she should start statin therapy.

## 2024-07-31 ENCOUNTER — LAB (OUTPATIENT)
Dept: LAB | Facility: LAB | Age: 69
End: 2024-07-31
Payer: MEDICARE

## 2024-07-31 DIAGNOSIS — M06.049 RHEUMATOID ARTHRITIS INVOLVING HAND WITH NEGATIVE RHEUMATOID FACTOR, UNSPECIFIED LATERALITY (MULTI): ICD-10-CM

## 2024-07-31 LAB
ALBUMIN SERPL BCP-MCNC: 4 G/DL (ref 3.4–5)
ALP SERPL-CCNC: 63 U/L (ref 33–136)
ALT SERPL W P-5'-P-CCNC: 12 U/L (ref 7–45)
ANION GAP SERPL CALC-SCNC: 11 MMOL/L (ref 10–20)
AST SERPL W P-5'-P-CCNC: 11 U/L (ref 9–39)
BASOPHILS # BLD AUTO: 0.03 X10*3/UL (ref 0–0.1)
BASOPHILS NFR BLD AUTO: 0.9 %
BILIRUB SERPL-MCNC: 0.6 MG/DL (ref 0–1.2)
BUN SERPL-MCNC: 14 MG/DL (ref 6–23)
CALCIUM SERPL-MCNC: 9.8 MG/DL (ref 8.6–10.6)
CHLORIDE SERPL-SCNC: 107 MMOL/L (ref 98–107)
CO2 SERPL-SCNC: 26 MMOL/L (ref 21–32)
CREAT SERPL-MCNC: 0.6 MG/DL (ref 0.5–1.05)
CRP SERPL-MCNC: 0.19 MG/DL
EGFRCR SERPLBLD CKD-EPI 2021: >90 ML/MIN/1.73M*2
EOSINOPHIL # BLD AUTO: 0.17 X10*3/UL (ref 0–0.7)
EOSINOPHIL NFR BLD AUTO: 5.1 %
ERYTHROCYTE [DISTWIDTH] IN BLOOD BY AUTOMATED COUNT: 14.5 % (ref 11.5–14.5)
GLUCOSE SERPL-MCNC: 94 MG/DL (ref 74–99)
HCT VFR BLD AUTO: 42.5 % (ref 36–46)
HGB BLD-MCNC: 14 G/DL (ref 12–16)
IMM GRANULOCYTES # BLD AUTO: 0.01 X10*3/UL (ref 0–0.7)
IMM GRANULOCYTES NFR BLD AUTO: 0.3 % (ref 0–0.9)
LYMPHOCYTES # BLD AUTO: 1.03 X10*3/UL (ref 1.2–4.8)
LYMPHOCYTES NFR BLD AUTO: 31 %
MCH RBC QN AUTO: 29.3 PG (ref 26–34)
MCHC RBC AUTO-ENTMCNC: 32.9 G/DL (ref 32–36)
MCV RBC AUTO: 89 FL (ref 80–100)
MONOCYTES # BLD AUTO: 0.42 X10*3/UL (ref 0.1–1)
MONOCYTES NFR BLD AUTO: 12.7 %
NEUTROPHILS # BLD AUTO: 1.66 X10*3/UL (ref 1.2–7.7)
NEUTROPHILS NFR BLD AUTO: 50 %
NRBC BLD-RTO: 0 /100 WBCS (ref 0–0)
PLATELET # BLD AUTO: 241 X10*3/UL (ref 150–450)
POTASSIUM SERPL-SCNC: 4 MMOL/L (ref 3.5–5.3)
PROT SERPL-MCNC: 6.3 G/DL (ref 6.4–8.2)
RBC # BLD AUTO: 4.78 X10*6/UL (ref 4–5.2)
SODIUM SERPL-SCNC: 140 MMOL/L (ref 136–145)
WBC # BLD AUTO: 3.3 X10*3/UL (ref 4.4–11.3)

## 2024-07-31 PROCEDURE — 86140 C-REACTIVE PROTEIN: CPT

## 2024-07-31 PROCEDURE — 36415 COLL VENOUS BLD VENIPUNCTURE: CPT

## 2024-07-31 PROCEDURE — 80053 COMPREHEN METABOLIC PANEL: CPT

## 2024-07-31 PROCEDURE — 85025 COMPLETE CBC W/AUTO DIFF WBC: CPT

## 2024-08-08 ENCOUNTER — APPOINTMENT (OUTPATIENT)
Dept: RHEUMATOLOGY | Facility: CLINIC | Age: 69
End: 2024-08-08
Payer: MEDICARE

## 2024-08-08 VITALS
WEIGHT: 131 LBS | SYSTOLIC BLOOD PRESSURE: 109 MMHG | HEIGHT: 68 IN | HEART RATE: 71 BPM | DIASTOLIC BLOOD PRESSURE: 73 MMHG | TEMPERATURE: 98.2 F | BODY MASS INDEX: 19.85 KG/M2

## 2024-08-08 DIAGNOSIS — M06.9 RHEUMATOID ARTHRITIS INVOLVING MULTIPLE JOINTS (MULTI): Primary | ICD-10-CM

## 2024-08-08 PROCEDURE — 3008F BODY MASS INDEX DOCD: CPT | Performed by: INTERNAL MEDICINE

## 2024-08-08 PROCEDURE — 1160F RVW MEDS BY RX/DR IN RCRD: CPT | Performed by: INTERNAL MEDICINE

## 2024-08-08 PROCEDURE — 99213 OFFICE O/P EST LOW 20 MIN: CPT | Performed by: INTERNAL MEDICINE

## 2024-08-08 PROCEDURE — 1036F TOBACCO NON-USER: CPT | Performed by: INTERNAL MEDICINE

## 2024-08-08 PROCEDURE — 1159F MED LIST DOCD IN RCRD: CPT | Performed by: INTERNAL MEDICINE

## 2024-08-08 PROCEDURE — 1126F AMNT PAIN NOTED NONE PRSNT: CPT | Performed by: INTERNAL MEDICINE

## 2024-08-08 ASSESSMENT — PAIN SCALES - GENERAL: PAINLEVEL: 0-NO PAIN

## 2024-10-01 ENCOUNTER — LAB (OUTPATIENT)
Dept: LAB | Facility: LAB | Age: 69
End: 2024-10-01
Payer: MEDICARE

## 2024-10-01 DIAGNOSIS — M06.049 RHEUMATOID ARTHRITIS INVOLVING HAND WITH NEGATIVE RHEUMATOID FACTOR, UNSPECIFIED LATERALITY (MULTI): ICD-10-CM

## 2024-10-01 LAB
ALBUMIN SERPL BCP-MCNC: 3.9 G/DL (ref 3.4–5)
ALP SERPL-CCNC: 75 U/L (ref 33–136)
ALT SERPL W P-5'-P-CCNC: 9 U/L (ref 7–45)
ANION GAP SERPL CALC-SCNC: 11 MMOL/L (ref 10–20)
AST SERPL W P-5'-P-CCNC: 12 U/L (ref 9–39)
BASOPHILS # BLD AUTO: 0.03 X10*3/UL (ref 0–0.1)
BASOPHILS NFR BLD AUTO: 0.9 %
BILIRUB SERPL-MCNC: 0.4 MG/DL (ref 0–1.2)
BUN SERPL-MCNC: 13 MG/DL (ref 6–23)
CALCIUM SERPL-MCNC: 9.9 MG/DL (ref 8.6–10.6)
CHLORIDE SERPL-SCNC: 109 MMOL/L (ref 98–107)
CO2 SERPL-SCNC: 26 MMOL/L (ref 21–32)
CREAT SERPL-MCNC: 0.56 MG/DL (ref 0.5–1.05)
CRP SERPL-MCNC: 0.2 MG/DL
EGFRCR SERPLBLD CKD-EPI 2021: >90 ML/MIN/1.73M*2
EOSINOPHIL # BLD AUTO: 0.18 X10*3/UL (ref 0–0.7)
EOSINOPHIL NFR BLD AUTO: 5.2 %
ERYTHROCYTE [DISTWIDTH] IN BLOOD BY AUTOMATED COUNT: 14.3 % (ref 11.5–14.5)
GLUCOSE SERPL-MCNC: 88 MG/DL (ref 74–99)
HCT VFR BLD AUTO: 44.5 % (ref 36–46)
HGB BLD-MCNC: 14.6 G/DL (ref 12–16)
IMM GRANULOCYTES # BLD AUTO: 0 X10*3/UL (ref 0–0.7)
IMM GRANULOCYTES NFR BLD AUTO: 0 % (ref 0–0.9)
LYMPHOCYTES # BLD AUTO: 1.09 X10*3/UL (ref 1.2–4.8)
LYMPHOCYTES NFR BLD AUTO: 31.4 %
MCH RBC QN AUTO: 29.7 PG (ref 26–34)
MCHC RBC AUTO-ENTMCNC: 32.8 G/DL (ref 32–36)
MCV RBC AUTO: 91 FL (ref 80–100)
MONOCYTES # BLD AUTO: 0.33 X10*3/UL (ref 0.1–1)
MONOCYTES NFR BLD AUTO: 9.5 %
NEUTROPHILS # BLD AUTO: 1.84 X10*3/UL (ref 1.2–7.7)
NEUTROPHILS NFR BLD AUTO: 53 %
NRBC BLD-RTO: 0 /100 WBCS (ref 0–0)
PLATELET # BLD AUTO: 209 X10*3/UL (ref 150–450)
POTASSIUM SERPL-SCNC: 4 MMOL/L (ref 3.5–5.3)
PROT SERPL-MCNC: 6.7 G/DL (ref 6.4–8.2)
RBC # BLD AUTO: 4.91 X10*6/UL (ref 4–5.2)
SODIUM SERPL-SCNC: 142 MMOL/L (ref 136–145)
WBC # BLD AUTO: 3.5 X10*3/UL (ref 4.4–11.3)

## 2024-10-01 PROCEDURE — 86140 C-REACTIVE PROTEIN: CPT

## 2024-10-01 PROCEDURE — 36415 COLL VENOUS BLD VENIPUNCTURE: CPT

## 2024-10-01 PROCEDURE — 80053 COMPREHEN METABOLIC PANEL: CPT

## 2024-10-01 PROCEDURE — 85025 COMPLETE CBC W/AUTO DIFF WBC: CPT

## 2024-10-16 ENCOUNTER — HOSPITAL ENCOUNTER (OUTPATIENT)
Dept: RADIOLOGY | Facility: HOSPITAL | Age: 69
Discharge: HOME | End: 2024-10-16
Payer: MEDICARE

## 2024-10-16 DIAGNOSIS — E78.00 PURE HYPERCHOLESTEROLEMIA: ICD-10-CM

## 2024-10-16 PROCEDURE — 75571 CT HRT W/O DYE W/CA TEST: CPT

## 2024-11-04 ENCOUNTER — OFFICE VISIT (OUTPATIENT)
Dept: GASTROENTEROLOGY | Facility: HOSPITAL | Age: 69
End: 2024-11-04
Payer: MEDICARE

## 2024-11-04 VITALS
HEIGHT: 68 IN | BODY MASS INDEX: 20.31 KG/M2 | TEMPERATURE: 97.7 F | SYSTOLIC BLOOD PRESSURE: 120 MMHG | HEART RATE: 69 BPM | WEIGHT: 134 LBS | DIASTOLIC BLOOD PRESSURE: 81 MMHG | OXYGEN SATURATION: 97 %

## 2024-11-04 DIAGNOSIS — Z80.0 FAMILY HISTORY OF COLON CANCER IN FATHER: Primary | ICD-10-CM

## 2024-11-04 DIAGNOSIS — K86.2 PANCREATIC CYST (HHS-HCC): ICD-10-CM

## 2024-11-04 DIAGNOSIS — D18.03 LIVER HEMANGIOMA: ICD-10-CM

## 2024-11-04 DIAGNOSIS — R05.3 CHRONIC COUGH: ICD-10-CM

## 2024-11-04 PROCEDURE — 3008F BODY MASS INDEX DOCD: CPT | Performed by: INTERNAL MEDICINE

## 2024-11-04 PROCEDURE — 1160F RVW MEDS BY RX/DR IN RCRD: CPT | Performed by: INTERNAL MEDICINE

## 2024-11-04 PROCEDURE — 99215 OFFICE O/P EST HI 40 MIN: CPT | Performed by: INTERNAL MEDICINE

## 2024-11-04 PROCEDURE — 99205 OFFICE O/P NEW HI 60 MIN: CPT | Performed by: INTERNAL MEDICINE

## 2024-11-04 PROCEDURE — 1159F MED LIST DOCD IN RCRD: CPT | Performed by: INTERNAL MEDICINE

## 2024-11-04 PROCEDURE — 1036F TOBACCO NON-USER: CPT | Performed by: INTERNAL MEDICINE

## 2024-11-04 PROCEDURE — 1126F AMNT PAIN NOTED NONE PRSNT: CPT | Performed by: INTERNAL MEDICINE

## 2024-11-04 RX ORDER — POLYETHYLENE GLYCOL 3350, SODIUM CHLORIDE, SODIUM BICARBONATE, POTASSIUM CHLORIDE 420; 11.2; 5.72; 1.48 G/4L; G/4L; G/4L; G/4L
4000 POWDER, FOR SOLUTION ORAL ONCE
Qty: 4000 ML | Refills: 0 | Status: SHIPPED | OUTPATIENT
Start: 2024-11-04 | End: 2024-11-04

## 2024-11-04 ASSESSMENT — PAIN SCALES - GENERAL: PAINLEVEL_OUTOF10: 0-NO PAIN

## 2024-11-04 NOTE — PROGRESS NOTES
Subjective   Patient ID:   Ms. 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 Danny Fraser - [facility] for “screening for colorectal cancer”.     The patient was last seen by me 10/14/19 (see note from that date for full details) for chronic cough and left globus sensation, secondary to non-acid laryngopharyngeal reflux based on 24-hour pH/impedance study 6/19/19. She declined surgical referral at that time as PPI and H2-blockers are not effective for this condition. She subsequently noted that her cough resolved. She only notes the cough when she is in air-conditioning environment such as her [employer]. Given her side-branch IPMN, she elected to pursue a repeat MRI of her pancreas with MRCP which was done 1/22/20, which revealed a stable 1.3 x 0.5 cm side-branch IPMN in the pancreatic tail. A repeat MRI was recommended in one year, but she did not follow up. Her rheumatologist ordered an ultrasound instead of an MRI 4/1/24 for follow up of the IPMN, but the pancreas was obscured by shadowing bowel gas. The radiologist recommended MRCP, but this did not get done. Due to her family history of colon cancer in her father, she agreed to undergo a high-risk colonoscopy which was  done 11/25/19 and revealed diverticulosis in the sigmoid and ascending colon and external hemorrhoids. A repeat high-risk screening colonoscopy was recommended in 5 years.  She would like to get this done here.     She denies any current symptoms. She denies any abdominal pain, anorexia, changes in bowel habits, hematochezia, melena, nausea, vomiting, hematemesis, dysphagia, or weight loss.    She was also noted to have a hypodense lesion in segment 6/7 of the liver measuring 2.3 x 2.5 cm which could not be characterized on the chest CT from 3/7/19, and the radiologist recommended further evaluation with liver ultrasound. In review of her scanned F records, the patient was previously seen by Dr. Roverto Bustamante - Hepatology 10/16/12 for a similar finding of a 3 cm right liver lesion on a chest CT 6/28/12. His note indicates that a subsequent liver ultrasound and liver MRI demonstrated hemangiomas. A tiny 6 mm side-branch pancreatic IPMN was also noted. A repeat MRI was recommended for 6 months to ensure stability of the hemangiomas. However, the patient states she did not have this done to her recollection. She did not even recall having the history of liver lesions in the past, having an ultrasound, or even having an MRI of the liver done before.      After discussion with her at her initial visit 4/14/19, she opted to pursue a repeat MRI of the liver. Her insurance would not cover MRI with contrast.  An MRI of the liver 6/12/19 revealed a liver hemangioma, and a 1.3 cm cystic lesion in the tail of the pancreas with communication to the main pancreatic duct consistent with side-branch pancreatic IPMN.     She reports she had normal colonoscopies done at age 49 (at [facility] - no records scanned into [facility] or available in  Physician Portal) and at age 59 (at [facility] - no records scanned with [facility] records in [facility]). Attempts to obtain these records have been  unsuccessful.     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   Lives with father and serves as his caretaker   No tobacco   No Etoh   No recreational or IVDA     FH:   F alive age 98 with CAD s/p MI, HTN, hyperlipidemia, history of diverticulosis, history of colon cancer diagnosed at age 85 or 86 requiring surgery (done at [facility]; no chemoradiation therapy needed after surgery)   M  age 95 from undefined heart problems; she had CAD, HTN, hyperlipidemia   S alive age 72 with rheumatoid arthritis and history of diverticulosis with diverticulitis requiring partial colon resection   B alive age 67 with history of retinal tear   No children   No other family members with colon polyps or colon cancer   No FH of IBD, PUD, liver disease, or pancreatic disease, to her knowledge    Objective   Physical Exam  Constitutional:       Appearance: Normal appearance.   HENT:      Mouth/Throat:      Mouth: Mucous membranes are moist.   Eyes:      Conjunctiva/sclera: Conjunctivae normal.   Cardiovascular:      Rate and Rhythm: Normal rate and regular rhythm.   Pulmonary:      Effort: Pulmonary effort is normal.      Breath sounds: Normal breath sounds.   Abdominal:      General: Abdomen is flat. Bowel sounds are normal. There is no distension.      Palpations: Abdomen is soft. There is no mass.      Tenderness: There is no abdominal tenderness. There is no guarding or rebound.      Hernia: No hernia is present.   Skin:     General: Skin is warm.   Neurological:      Mental Status: She is oriented to person, place, and time.   Psychiatric:         Mood and Affect: Mood normal.         Assessment/Plan     High-risk colorectal cancer screening due to father’s history of colon cancer. As previously noted, she is due for repeat high risk screening colonoscopy now. I again discussed the  various options, risks, benefits, and advantages and disadvantages of each option, including doing nothing, with the patient. After discussion, the patient has elected to proceed with repeat high-risk screening colonoscopy.      Side-branch intraductal papillary mucinous neoplasm of pancreatic tail. I discussed the natural history of this disease and its low likelihood for developing malignancy. I discussed options for further evaluation and management (including referral for distal pancreatectomy). After discussion, the patient opts to pursue repeat MRI of the pancreas with MRCP.     Chronic cough secondary to non-acid laryngopharyngeal reflux based on 24-hour pH/impedance study 6/19/19. The symptoms resolved, and she only notes the cough in an air-conditioned environment.      Liver hemangiomas. No further evaluation is needed.     All the patient's questions were answered to her full satisfaction.      Medical decision-making and time spent in both non-face-to-face time and face-to-face included time spent preparing to see patient, obtaining and/or reviewing separately obtained history, review and/or ordering of labs, radiology studies (including personal review of imaging), medical tests, and/or prior medical records (including summary of records), independently interpreting results and/or communicating results to patient, review and/or ordering of endoscopic procedures with risk factors, performing a medically necessary appropriate physical examination, counseling and educating the patient, communicating with other providers, care coordination, and documenting clinical information as noted above.?     Dequan Potts MD, MUSHTAQ?   Chief Medical ?   [facility]?   Associate Chief and Director of Clinical Operations?   Division of Gastroenterology and Liver Disease?   [facility]?    Master Clinician?   Professor of Medicine?    [facility]?     cc: Danny Fraser MD - [facility]     Time Spent  Prep time on day of patient encounter: 95 minutes  Time spent directly with patient, family or caregiver: 40 minutes  Additional Time Spent on Patient Care Activities: 10 minutes  Documentation Time: 25 minutes  Other Time Spent: 0 minutes  Total: 170 minutes

## 2024-11-04 NOTE — LETTER
November 4, 2024     Danny Fraser MD  [address]  [address]  [place] [facility] [address]    Patient: Trang Ramos   YOB: 1955   Date of Visit: 11/4/2024       Dear Dr. Danny Fraser MD:    Thank you for referring Trang Ramos to me for evaluation. Below are my notes for this consultation.  If you have questions, please do not hesitate to call me. I look forward to following your patient along with you.       Sincerely,     Dequan Potts MD      CC: No Recipients  ______________________________________________________________________________________    Subjective  Patient ID:   Ms. 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 Danny Fraser - [facility] for “screening for colorectal cancer”.     The patient was last seen by me 10/14/19 (see note from that date for full details) for chronic cough and left globus sensation, secondary to non-acid laryngopharyngeal reflux based on 24-hour pH/impedance study 6/19/19. She declined surgical referral at that time as PPI and H2-blockers are not effective for this condition. She subsequently noted that her cough resolved. She only notes the cough when she is in air-conditioning environment such as her [employer]. Given  her side-branch IPMN, she elected to pursue a repeat MRI of her pancreas with MRCP which was done 1/22/20, which revealed a stable 1.3 x 0.5 cm side-branch IPMN in the pancreatic tail. A repeat MRI was recommended in one year, but she did not follow up. Her rheumatologist ordered an ultrasound instead of an MRI 4/1/24 for follow up of the IPMN, but the pancreas was obscured by shadowing bowel gas. The radiologist recommended MRCP, but this did not get done. Due to her family history of colon cancer in her father, she agreed to undergo a high-risk colonoscopy which was done 11/25/19 and revealed diverticulosis in the sigmoid and ascending colon and external hemorrhoids. A repeat high-risk screening colonoscopy was recommended in 5 years.  She would like to get this done here.     She denies any current symptoms. She denies any abdominal pain, anorexia, changes in bowel habits, hematochezia, melena, nausea, vomiting, hematemesis, dysphagia, or weight loss.    She was also noted to have a hypodense lesion in segment 6/7 of the liver measuring 2.3 x 2.5 cm which could not be characterized on the chest CT from 3/7/19, and the radiologist recommended further evaluation with liver ultrasound. In review of her scanned F records, the patient was previously seen by Dr. Roverto Bustamante - Hepatology 10/16/12 for a similar finding of a 3 cm right liver lesion on a chest CT 6/28/12. His note indicates that a subsequent liver ultrasound and liver MRI demonstrated hemangiomas. A tiny 6 mm side-branch pancreatic IPMN was also noted. A repeat MRI was recommended for 6 months to ensure stability of the hemangiomas. However, the patient states she did not have this done to her recollection. She did not even recall having the history of liver lesions in the past, having an ultrasound, or even having an MRI of the liver done before.      After discussion with her at her initial visit 4/14/19, she opted to pursue a repeat MRI of the  liver. Her insurance would not cover MRI with contrast.  An MRI of the liver 19 revealed a liver hemangioma, and a 1.3 cm cystic lesion in the tail of the pancreas with communication to the main pancreatic duct consistent with side-branch pancreatic IPMN.     She reports she had normal colonoscopies done at age 49 (at [facility] [address] - no records scanned into  AE or available in  Physician Portal) and at age 59 (at [facility] [address] - no records scanned with [facility] records in  AE). Attempts to obtain these records have been unsuccessful.     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; no chemoradiation therapy needed after surgery)   M  age 89 from undefined heart problems; she had CAD, HTN, hyperlipidemia   S alive age 72 with rheumatoid arthritis and history of diverticulosis with diverticulitis requiring partial colon resection   B alive age 67 with history of retinal tear   No children   No other family members with colon polyps or colon cancer   No FH of IBD, PUD, liver disease, or pancreatic disease, to her knowledge    Objective  Physical Exam  Constitutional:       Appearance: Normal appearance.   HENT:      Mouth/Throat:      Mouth: Mucous membranes are moist.   Eyes:      Conjunctiva/sclera: Conjunctivae normal.   Cardiovascular:      Rate and Rhythm: Normal rate and regular rhythm.   Pulmonary:      Effort: Pulmonary effort is normal.      Breath sounds: Normal breath sounds.   Abdominal:      General: Abdomen is flat. Bowel sounds are normal. There  is no distension.      Palpations: Abdomen is soft. There is no mass.      Tenderness: There is no abdominal tenderness. There is no guarding or rebound.      Hernia: No hernia is present.   Skin:     General: Skin is warm.   Neurological:      Mental Status: She is oriented to person, place, and time.   Psychiatric:         Mood and Affect: Mood normal.         Assessment/Plan    High-risk colorectal cancer screening due to father’s history of colon cancer. As previously noted, she is due for repeat high risk screening colonoscopy now. I again discussed the various options, risks, benefits, and advantages and disadvantages of each option, including doing nothing, with the patient. After discussion, the patient has elected to proceed with repeat high-risk screening colonoscopy.      Side-branch intraductal papillary mucinous neoplasm of pancreatic tail. I discussed the natural history of this disease and its low likelihood for developing malignancy. I discussed options for further evaluation and management (including referral for distal pancreatectomy). After discussion, the patient opts to pursue repeat MRI of the pancreas with MRCP.     Chronic cough secondary to non-acid laryngopharyngeal reflux based on 24-hour pH/impedance study 6/19/19. The symptoms resolved, and she only notes the cough in an air-conditioned environment.      Liver hemangiomas. No further evaluation is needed.     All the patient's questions were answered to her full satisfaction.      Medical decision-making and time spent in both non-face-to-face time and face-to-face included time spent preparing to see patient, obtaining and/or reviewing separately obtained history, review and/or ordering of labs, radiology studies (including personal review of imaging), medical tests, and/or prior medical records (including summary of records), independently interpreting results and/or communicating results to patient, review and/or ordering of  endoscopic procedures with risk factors, performing a medically necessary appropriate physical examination, counseling and educating the patient, communicating with other providers, care coordination, and documenting clinical information as noted above.?     Dequan Potts MD, MUSHTAQ?   Chief Medical ?   [facility]?   Associate Chief and Director of Clinical Operations?   Division of Gastroenterology and Liver Disease?   [facility]?    Master Clinician?   Professor of Medicine?   [facility]?     cc: Danny Fraser MD - [facility]     Time Spent  Prep time on day of patient encounter: 95 minutes  Time spent directly with patient, family or caregiver: 40 minutes  Additional Time Spent on Patient Care Activities: 10 minutes  Documentation Time: 25 minutes  Other Time Spent: 0 minutes  Total: 170 minutes

## 2024-11-04 NOTE — PATIENT INSTRUCTIONS
Thanks for seeing me! We discussed high-risk colon cancer screening due to your family history; you decided to proceed with the colonoscopy. The preparation prescription was sent to your pharmacy. We discussed options for further evaluation and management of your small pancreatic cyst in the tail of the pancreas. After discussion, you have opted to pursue the MRI and MRCP of your pancreas. The GI schedulers will contact you for the colonoscopy procedure, and the Radiology schedulers will contact you for the MRI test.  If you have any other questions or concerns that come up after you see me today, you can call me at [phone].

## 2024-11-15 ENCOUNTER — TELEPHONE (OUTPATIENT)
Dept: CARDIOLOGY | Facility: HOSPITAL | Age: 69
End: 2024-11-15
Payer: MEDICARE

## 2024-11-15 NOTE — TELEPHONE ENCOUNTER
----- Message from Maurice Sahni sent at 11/8/2024  4:52 PM EST -----  CT cardiac score of 0.  Low risk.  Normal aorta.  Nonspecific findings in the left lower lobe slightly increased from 2019.  Follow-up with primary physician.

## 2024-11-15 NOTE — TELEPHONE ENCOUNTER
Called and spoke to patient about results. She verbalized understanding and said she would reach out to her PCP for follow up.

## 2024-11-18 ENCOUNTER — APPOINTMENT (OUTPATIENT)
Dept: RADIOLOGY | Facility: CLINIC | Age: 69
End: 2024-11-18
Payer: MEDICARE

## 2024-11-21 ENCOUNTER — LAB (OUTPATIENT)
Dept: LAB | Facility: LAB | Age: 69
End: 2024-11-21
Payer: MEDICARE

## 2024-11-21 DIAGNOSIS — M06.049 RHEUMATOID ARTHRITIS INVOLVING HAND WITH NEGATIVE RHEUMATOID FACTOR, UNSPECIFIED LATERALITY (MULTI): ICD-10-CM

## 2024-11-21 LAB
ALBUMIN SERPL BCP-MCNC: 3.8 G/DL (ref 3.4–5)
ALP SERPL-CCNC: 75 U/L (ref 33–136)
ALT SERPL W P-5'-P-CCNC: 12 U/L (ref 7–45)
ANION GAP SERPL CALC-SCNC: 11 MMOL/L (ref 10–20)
AST SERPL W P-5'-P-CCNC: 15 U/L (ref 9–39)
BASOPHILS # BLD AUTO: 0.04 X10*3/UL (ref 0–0.1)
BASOPHILS NFR BLD AUTO: 1.1 %
BILIRUB SERPL-MCNC: 0.6 MG/DL (ref 0–1.2)
BUN SERPL-MCNC: 13 MG/DL (ref 6–23)
CALCIUM SERPL-MCNC: 9.8 MG/DL (ref 8.6–10.6)
CHLORIDE SERPL-SCNC: 107 MMOL/L (ref 98–107)
CO2 SERPL-SCNC: 26 MMOL/L (ref 21–32)
CREAT SERPL-MCNC: 0.5 MG/DL (ref 0.5–1.05)
CRP SERPL-MCNC: 0.29 MG/DL
EGFRCR SERPLBLD CKD-EPI 2021: >90 ML/MIN/1.73M*2
EOSINOPHIL # BLD AUTO: 0.22 X10*3/UL (ref 0–0.7)
EOSINOPHIL NFR BLD AUTO: 6 %
ERYTHROCYTE [DISTWIDTH] IN BLOOD BY AUTOMATED COUNT: 14.6 % (ref 11.5–14.5)
GLUCOSE SERPL-MCNC: 86 MG/DL (ref 74–99)
HCT VFR BLD AUTO: 42.6 % (ref 36–46)
HGB BLD-MCNC: 14.1 G/DL (ref 12–16)
IMM GRANULOCYTES # BLD AUTO: 0 X10*3/UL (ref 0–0.7)
IMM GRANULOCYTES NFR BLD AUTO: 0 % (ref 0–0.9)
LYMPHOCYTES # BLD AUTO: 0.88 X10*3/UL (ref 1.2–4.8)
LYMPHOCYTES NFR BLD AUTO: 23.9 %
MCH RBC QN AUTO: 30.1 PG (ref 26–34)
MCHC RBC AUTO-ENTMCNC: 33.1 G/DL (ref 32–36)
MCV RBC AUTO: 91 FL (ref 80–100)
MONOCYTES # BLD AUTO: 0.39 X10*3/UL (ref 0.1–1)
MONOCYTES NFR BLD AUTO: 10.6 %
NEUTROPHILS # BLD AUTO: 2.15 X10*3/UL (ref 1.2–7.7)
NEUTROPHILS NFR BLD AUTO: 58.4 %
NRBC BLD-RTO: 0 /100 WBCS (ref 0–0)
PLATELET # BLD AUTO: 229 X10*3/UL (ref 150–450)
POTASSIUM SERPL-SCNC: 4.1 MMOL/L (ref 3.5–5.3)
PROT SERPL-MCNC: 6.3 G/DL (ref 6.4–8.2)
RBC # BLD AUTO: 4.68 X10*6/UL (ref 4–5.2)
SODIUM SERPL-SCNC: 140 MMOL/L (ref 136–145)
WBC # BLD AUTO: 3.7 X10*3/UL (ref 4.4–11.3)

## 2024-11-21 PROCEDURE — 80053 COMPREHEN METABOLIC PANEL: CPT

## 2024-11-21 PROCEDURE — 36415 COLL VENOUS BLD VENIPUNCTURE: CPT

## 2024-11-21 PROCEDURE — 86140 C-REACTIVE PROTEIN: CPT

## 2024-11-21 PROCEDURE — 85025 COMPLETE CBC W/AUTO DIFF WBC: CPT

## 2024-11-25 ENCOUNTER — APPOINTMENT (OUTPATIENT)
Dept: RADIOLOGY | Facility: HOSPITAL | Age: 69
End: 2024-11-25
Payer: MEDICARE

## 2024-12-04 ENCOUNTER — HOSPITAL ENCOUNTER (OUTPATIENT)
Dept: RADIOLOGY | Facility: CLINIC | Age: 69
Discharge: HOME | End: 2024-12-04
Payer: MEDICARE

## 2024-12-04 DIAGNOSIS — K86.2 PANCREATIC CYST (HHS-HCC): ICD-10-CM

## 2024-12-04 PROCEDURE — 74183 MRI ABD W/O CNTR FLWD CNTR: CPT

## 2024-12-04 PROCEDURE — 2550000001 HC RX 255 CONTRASTS: Performed by: INTERNAL MEDICINE

## 2024-12-04 PROCEDURE — A9575 INJ GADOTERATE MEGLUMI 0.1ML: HCPCS | Performed by: INTERNAL MEDICINE

## 2024-12-04 RX ORDER — GADOTERATE MEGLUMINE 376.9 MG/ML
12 INJECTION INTRAVENOUS
Status: COMPLETED | OUTPATIENT
Start: 2024-12-04 | End: 2024-12-04

## 2024-12-05 NOTE — RESULT ENCOUNTER NOTE
Nata,   Please send letter to patient's PCP as he is not in the  [facility] system.   Thank you.     I called the patient [phone] and informed her of the results of MRI. She is aware of the need for repeat MRI of pancreas in one year based on  Pancreatic Cyst Protocol.  All her questions were answered to her full satisfaction.

## 2025-01-09 ENCOUNTER — APPOINTMENT (OUTPATIENT)
Dept: OPHTHALMOLOGY | Facility: CLINIC | Age: 70
End: 2025-01-09
Payer: MEDICARE

## 2025-01-24 ENCOUNTER — HOSPITAL ENCOUNTER (OUTPATIENT)
Dept: GASTROENTEROLOGY | Facility: HOSPITAL | Age: 70
Discharge: HOME | End: 2025-01-24
Payer: MEDICARE

## 2025-01-24 VITALS
BODY MASS INDEX: 20.46 KG/M2 | HEART RATE: 65 BPM | DIASTOLIC BLOOD PRESSURE: 60 MMHG | SYSTOLIC BLOOD PRESSURE: 99 MMHG | OXYGEN SATURATION: 97 % | RESPIRATION RATE: 14 BRPM | HEIGHT: 68 IN | TEMPERATURE: 97.9 F | WEIGHT: 135 LBS

## 2025-01-24 DIAGNOSIS — Z80.0 FAMILY HISTORY OF COLON CANCER IN FATHER: ICD-10-CM

## 2025-01-24 PROCEDURE — 3700000013 HC SEDATION LEVEL 5+ TIME - EACH ADDITIONAL 15 MINUTES

## 2025-01-24 PROCEDURE — 7100000009 HC PHASE TWO TIME - INITIAL BASE CHARGE

## 2025-01-24 PROCEDURE — 7100000010 HC PHASE TWO TIME - EACH INCREMENTAL 1 MINUTE

## 2025-01-24 PROCEDURE — 2500000004 HC RX 250 GENERAL PHARMACY W/ HCPCS (ALT 636 FOR OP/ED): Performed by: INTERNAL MEDICINE

## 2025-01-24 PROCEDURE — 3700000012 HC SEDATION LEVEL 5+ TIME - INITIAL 15 MINUTES 5/> YEARS

## 2025-01-24 PROCEDURE — 45385 COLONOSCOPY W/LESION REMOVAL: CPT | Performed by: INTERNAL MEDICINE

## 2025-01-24 RX ORDER — ONDANSETRON HYDROCHLORIDE 2 MG/ML
4 INJECTION, SOLUTION INTRAVENOUS ONCE
Status: COMPLETED | OUTPATIENT
Start: 2025-01-24 | End: 2025-01-24

## 2025-01-24 RX ORDER — MIDAZOLAM HYDROCHLORIDE 1 MG/ML
INJECTION, SOLUTION INTRAMUSCULAR; INTRAVENOUS AS NEEDED
Status: COMPLETED | OUTPATIENT
Start: 2025-01-24 | End: 2025-01-24

## 2025-01-24 RX ORDER — MEPERIDINE HYDROCHLORIDE 50 MG/ML
INJECTION INTRAMUSCULAR; INTRAVENOUS; SUBCUTANEOUS AS NEEDED
Status: COMPLETED | OUTPATIENT
Start: 2025-01-24 | End: 2025-01-24

## 2025-01-24 RX ADMIN — ONDANSETRON 4 MG: 2 INJECTION, SOLUTION INTRAMUSCULAR; INTRAVENOUS at 10:15

## 2025-01-24 RX ADMIN — MIDAZOLAM 2 MG: 1 INJECTION INTRAMUSCULAR; INTRAVENOUS at 09:21

## 2025-01-24 RX ADMIN — MEPERIDINE HYDROCHLORIDE 50 MG: 50 INJECTION INTRAMUSCULAR; INTRAVENOUS; SUBCUTANEOUS at 09:21

## 2025-01-24 ASSESSMENT — COLUMBIA-SUICIDE SEVERITY RATING SCALE - C-SSRS
2. HAVE YOU ACTUALLY HAD ANY THOUGHTS OF KILLING YOURSELF?: NO
6. HAVE YOU EVER DONE ANYTHING, STARTED TO DO ANYTHING, OR PREPARED TO DO ANYTHING TO END YOUR LIFE?: NO
1. IN THE PAST MONTH, HAVE YOU WISHED YOU WERE DEAD OR WISHED YOU COULD GO TO SLEEP AND NOT WAKE UP?: NO

## 2025-01-24 ASSESSMENT — PAIN SCALES - GENERAL
PAINLEVEL_OUTOF10: 0 - NO PAIN

## 2025-01-24 ASSESSMENT — PAIN - FUNCTIONAL ASSESSMENT
PAIN_FUNCTIONAL_ASSESSMENT: 0-10
PAIN_FUNCTIONAL_ASSESSMENT: UNABLE TO SELF-REPORT
PAIN_FUNCTIONAL_ASSESSMENT: UNABLE TO SELF-REPORT
PAIN_FUNCTIONAL_ASSESSMENT: 0-10
PAIN_FUNCTIONAL_ASSESSMENT: 0-10
PAIN_FUNCTIONAL_ASSESSMENT: UNABLE TO SELF-REPORT
PAIN_FUNCTIONAL_ASSESSMENT: 0-10
PAIN_FUNCTIONAL_ASSESSMENT: 0-10
PAIN_FUNCTIONAL_ASSESSMENT: UNABLE TO SELF-REPORT
PAIN_FUNCTIONAL_ASSESSMENT: 0-10
PAIN_FUNCTIONAL_ASSESSMENT: UNABLE TO SELF-REPORT
PAIN_FUNCTIONAL_ASSESSMENT: 0-10

## 2025-01-24 NOTE — DISCHARGE INSTRUCTIONS

## 2025-01-30 ENCOUNTER — APPOINTMENT (OUTPATIENT)
Dept: RHEUMATOLOGY | Facility: CLINIC | Age: 70
End: 2025-01-30
Payer: MEDICARE

## 2025-01-30 VITALS
TEMPERATURE: 98 F | SYSTOLIC BLOOD PRESSURE: 119 MMHG | WEIGHT: 135 LBS | BODY MASS INDEX: 21.19 KG/M2 | DIASTOLIC BLOOD PRESSURE: 77 MMHG | HEART RATE: 76 BPM | HEIGHT: 67 IN

## 2025-01-30 DIAGNOSIS — M06.9 RHEUMATOID ARTHRITIS INVOLVING MULTIPLE JOINTS (MULTI): Primary | ICD-10-CM

## 2025-01-30 PROCEDURE — 1126F AMNT PAIN NOTED NONE PRSNT: CPT | Performed by: INTERNAL MEDICINE

## 2025-01-30 PROCEDURE — 1036F TOBACCO NON-USER: CPT | Performed by: INTERNAL MEDICINE

## 2025-01-30 PROCEDURE — 1159F MED LIST DOCD IN RCRD: CPT | Performed by: INTERNAL MEDICINE

## 2025-01-30 PROCEDURE — 99214 OFFICE O/P EST MOD 30 MIN: CPT | Performed by: INTERNAL MEDICINE

## 2025-01-30 PROCEDURE — 3008F BODY MASS INDEX DOCD: CPT | Performed by: INTERNAL MEDICINE

## 2025-01-30 ASSESSMENT — PAIN SCALES - GENERAL: PAINLEVEL_OUTOF10: 0-NO PAIN

## 2025-01-30 NOTE — PROGRESS NOTES
She presents for follow-up evaluation with some discomfort involving her hands.  She is unable to fully extend the digits of either hand.  She notes some discomfort in the right shoulder.  She has osteoporosis but was unable to tolerate antiresorptive therapy in the past due to exacerbation of musculoskeletal pain.  She has not noted any rashes but does note coldness and discoloration of her fingers and toes with cold exposure.    She has a thin body habitus.  The lungs are clear to auscultation.  The heart is regular in rhythm with normal heart sounds.  The abdomen is benign.  Extremities are without edema.  The musculoskeletal examination shows flexion contracture of the MCP joint of the digits of both hands.  There is slight boutonniere deformity of some of the digits of the right hand.  There is mild J deformity of the thumbs.  There is markedly limited range of motion of the right wrist and severely limited range of motion of the left wrist.  Elbows shoulders and knees are not swollen.  The integument does not show any digital ulcers.  There is no sclerodactyly or telangiectasia.    MRI scan abdomen and pelvis (12//2024) 13 mm tubular shaped cystic lesion in the tail of the pancreas.  3.2 cm hemangioma and a 7 mm hemangioma in the liver.  Small bilateral renal cysts.    DEXA bone density (9/14/2022) L1-L4 T-score -2.9, left femoral neck T-score -2.1, left total femur T-score -1.8, right femoral neck T-score -2.3, right total femur T-score -1.9.    Laboratory (11/21/2024) WBC 3.7, hemoglobin 14.1, hematocrit 42.6, MCV 91, MCHC 33.1, platelets 229, BUN 13, creatinine 0.50, glucose 86, calcium 9.8, albumin 3.8, alkaline phosphatase 75, AST 15, ALT 12, CRP 0.29.    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    She is to continue Xeljanz 5 mg twice per week and prednisone 5 mg 2 to 3 days/week.  She is to have laboratory for follow-up of Xeljanz  therapy.  She is to return at the next available office appointment.

## 2025-01-31 LAB
LABORATORY COMMENT REPORT: NORMAL
PATH REPORT.FINAL DX SPEC: NORMAL
PATH REPORT.GROSS SPEC: NORMAL
PATH REPORT.TOTAL CANCER: NORMAL

## 2025-02-15 LAB
ALBUMIN SERPL-MCNC: 4 G/DL (ref 3.6–5.1)
ALP SERPL-CCNC: 73 U/L (ref 37–153)
ALT SERPL-CCNC: 17 U/L (ref 6–29)
ANION GAP SERPL CALCULATED.4IONS-SCNC: 9 MMOL/L (CALC) (ref 7–17)
AST SERPL-CCNC: 14 U/L (ref 10–35)
BASOPHILS # BLD AUTO: 29 CELLS/UL (ref 0–200)
BASOPHILS NFR BLD AUTO: 0.7 %
BILIRUB SERPL-MCNC: 0.4 MG/DL (ref 0.2–1.2)
BUN SERPL-MCNC: 13 MG/DL (ref 7–25)
CALCIUM SERPL-MCNC: 9.8 MG/DL (ref 8.6–10.4)
CHLORIDE SERPL-SCNC: 107 MMOL/L (ref 98–110)
CO2 SERPL-SCNC: 24 MMOL/L (ref 20–32)
CREAT SERPL-MCNC: 0.57 MG/DL (ref 0.6–1)
CRP SERPL-MCNC: NORMAL MG/L
EGFRCR SERPLBLD CKD-EPI 2021: 97 ML/MIN/1.73M2
EOSINOPHIL # BLD AUTO: 127 CELLS/UL (ref 15–500)
EOSINOPHIL NFR BLD AUTO: 3.1 %
ERYTHROCYTE [DISTWIDTH] IN BLOOD BY AUTOMATED COUNT: 13.3 % (ref 11–15)
GLUCOSE SERPL-MCNC: 86 MG/DL (ref 65–99)
HCT VFR BLD AUTO: 43.6 % (ref 35–45)
HGB BLD-MCNC: 14.4 G/DL (ref 11.7–15.5)
LYMPHOCYTES # BLD AUTO: 1021 CELLS/UL (ref 850–3900)
LYMPHOCYTES NFR BLD AUTO: 24.9 %
MCH RBC QN AUTO: 29.4 PG (ref 27–33)
MCHC RBC AUTO-ENTMCNC: 33 G/DL (ref 32–36)
MCV RBC AUTO: 89 FL (ref 80–100)
MONOCYTES # BLD AUTO: 328 CELLS/UL (ref 200–950)
MONOCYTES NFR BLD AUTO: 8 %
NEUTROPHILS # BLD AUTO: 2595 CELLS/UL (ref 1500–7800)
NEUTROPHILS NFR BLD AUTO: 63.3 %
PLATELET # BLD AUTO: 246 THOUSAND/UL (ref 140–400)
PMV BLD REES-ECKER: 11.3 FL (ref 7.5–12.5)
POTASSIUM SERPL-SCNC: 4.2 MMOL/L (ref 3.5–5.3)
PROT SERPL-MCNC: 6.7 G/DL (ref 6.1–8.1)
RBC # BLD AUTO: 4.9 MILLION/UL (ref 3.8–5.1)
SODIUM SERPL-SCNC: 140 MMOL/L (ref 135–146)
WBC # BLD AUTO: 4.1 THOUSAND/UL (ref 3.8–10.8)

## 2025-02-17 LAB
ALBUMIN SERPL-MCNC: 4 G/DL (ref 3.6–5.1)
ALP SERPL-CCNC: 73 U/L (ref 37–153)
ALT SERPL-CCNC: 17 U/L (ref 6–29)
ANION GAP SERPL CALCULATED.4IONS-SCNC: 9 MMOL/L (CALC) (ref 7–17)
AST SERPL-CCNC: 14 U/L (ref 10–35)
BASOPHILS # BLD AUTO: 29 CELLS/UL (ref 0–200)
BASOPHILS NFR BLD AUTO: 0.7 %
BILIRUB SERPL-MCNC: 0.4 MG/DL (ref 0.2–1.2)
BUN SERPL-MCNC: 13 MG/DL (ref 7–25)
CALCIUM SERPL-MCNC: 9.8 MG/DL (ref 8.6–10.4)
CHLORIDE SERPL-SCNC: 107 MMOL/L (ref 98–110)
CO2 SERPL-SCNC: 24 MMOL/L (ref 20–32)
CREAT SERPL-MCNC: 0.57 MG/DL (ref 0.6–1)
CRP SERPL-MCNC: 4 MG/L
EGFRCR SERPLBLD CKD-EPI 2021: 97 ML/MIN/1.73M2
EOSINOPHIL # BLD AUTO: 127 CELLS/UL (ref 15–500)
EOSINOPHIL NFR BLD AUTO: 3.1 %
ERYTHROCYTE [DISTWIDTH] IN BLOOD BY AUTOMATED COUNT: 13.3 % (ref 11–15)
GLUCOSE SERPL-MCNC: 86 MG/DL (ref 65–99)
HCT VFR BLD AUTO: 43.6 % (ref 35–45)
HGB BLD-MCNC: 14.4 G/DL (ref 11.7–15.5)
LYMPHOCYTES # BLD AUTO: 1021 CELLS/UL (ref 850–3900)
LYMPHOCYTES NFR BLD AUTO: 24.9 %
MCH RBC QN AUTO: 29.4 PG (ref 27–33)
MCHC RBC AUTO-ENTMCNC: 33 G/DL (ref 32–36)
MCV RBC AUTO: 89 FL (ref 80–100)
MONOCYTES # BLD AUTO: 328 CELLS/UL (ref 200–950)
MONOCYTES NFR BLD AUTO: 8 %
NEUTROPHILS # BLD AUTO: 2595 CELLS/UL (ref 1500–7800)
NEUTROPHILS NFR BLD AUTO: 63.3 %
PLATELET # BLD AUTO: 246 THOUSAND/UL (ref 140–400)
PMV BLD REES-ECKER: 11.3 FL (ref 7.5–12.5)
POTASSIUM SERPL-SCNC: 4.2 MMOL/L (ref 3.5–5.3)
PROT SERPL-MCNC: 6.7 G/DL (ref 6.1–8.1)
RBC # BLD AUTO: 4.9 MILLION/UL (ref 3.8–5.1)
SODIUM SERPL-SCNC: 140 MMOL/L (ref 135–146)
WBC # BLD AUTO: 4.1 THOUSAND/UL (ref 3.8–10.8)

## 2025-03-06 ENCOUNTER — APPOINTMENT (OUTPATIENT)
Dept: OPHTHALMOLOGY | Facility: CLINIC | Age: 70
End: 2025-03-06
Payer: MEDICARE

## 2025-03-06 DIAGNOSIS — H40.003 GLAUCOMA SUSPECT OF BOTH EYES: Primary | ICD-10-CM

## 2025-03-06 PROCEDURE — 92083 EXTENDED VISUAL FIELD XM: CPT | Performed by: OPHTHALMOLOGY

## 2025-03-06 PROCEDURE — 99214 OFFICE O/P EST MOD 30 MIN: CPT | Performed by: OPHTHALMOLOGY

## 2025-03-06 ASSESSMENT — ENCOUNTER SYMPTOMS
ENDOCRINE NEGATIVE: 0
MUSCULOSKELETAL NEGATIVE: 0
ALLERGIC/IMMUNOLOGIC NEGATIVE: 0
CONSTITUTIONAL NEGATIVE: 0
RESPIRATORY NEGATIVE: 0
NEUROLOGICAL NEGATIVE: 0
PSYCHIATRIC NEGATIVE: 0
CARDIOVASCULAR NEGATIVE: 0
EYES NEGATIVE: 0
HEMATOLOGIC/LYMPHATIC NEGATIVE: 0
GASTROINTESTINAL NEGATIVE: 0

## 2025-03-06 ASSESSMENT — EXTERNAL EXAM - RIGHT EYE: OD_EXAM: NORMAL

## 2025-03-06 ASSESSMENT — VISUAL ACUITY
OS_CC: 20/25
CORRECTION_TYPE: GLASSES
OD_CC: 20/20
OD_CC+: -1
OS_CC+: -3
METHOD: SNELLEN - LINEAR

## 2025-03-06 ASSESSMENT — EXTERNAL EXAM - LEFT EYE: OS_EXAM: NORMAL

## 2025-03-06 ASSESSMENT — SLIT LAMP EXAM - LIDS
COMMENTS: GOOD POSITION
COMMENTS: GOOD POSITION

## 2025-03-06 ASSESSMENT — REFRACTION_WEARINGRX
OS_SPHERE: -6.00
OD_AXIS: 152
OD_SPHERE: -7.75
OD_CYLINDER: -0.50
OS_CYLINDER: -1.50

## 2025-03-06 ASSESSMENT — TONOMETRY
OD_IOP_MMHG: 13
IOP_METHOD: GOLDMANN APPLANATION
OS_IOP_MMHG: 14

## 2025-03-06 ASSESSMENT — PACHYMETRY
OS_CT(UM): 597
OD_CT(UM): 582

## 2025-03-06 ASSESSMENT — GONIOSCOPY
OS_SUPERIOR: 3/360
OD_SUPERIOR: 3/360

## 2025-03-21 DIAGNOSIS — M06.9 RHEUMATOID ARTHRITIS INVOLVING MULTIPLE JOINTS (MULTI): ICD-10-CM

## 2025-03-23 DIAGNOSIS — M06.049 RHEUMATOID ARTHRITIS INVOLVING HAND WITH NEGATIVE RHEUMATOID FACTOR, UNSPECIFIED LATERALITY (MULTI): ICD-10-CM

## 2025-03-24 RX ORDER — PREDNISONE 5 MG/1
TABLET ORAL
Qty: 24 TABLET | Refills: 3 | Status: SHIPPED | OUTPATIENT
Start: 2025-03-24

## 2025-04-06 LAB
ALBUMIN SERPL-MCNC: 3.9 G/DL (ref 3.6–5.1)
ALP SERPL-CCNC: 72 U/L (ref 37–153)
ALT SERPL-CCNC: 14 U/L (ref 6–29)
ANION GAP SERPL CALCULATED.4IONS-SCNC: 7 MMOL/L (CALC) (ref 7–17)
AST SERPL-CCNC: 13 U/L (ref 10–35)
BASOPHILS # BLD AUTO: 40 CELLS/UL (ref 0–200)
BASOPHILS NFR BLD AUTO: 1.1 %
BILIRUB SERPL-MCNC: 0.5 MG/DL (ref 0.2–1.2)
BUN SERPL-MCNC: 14 MG/DL (ref 7–25)
CALCIUM SERPL-MCNC: 9.4 MG/DL (ref 8.6–10.4)
CHLORIDE SERPL-SCNC: 107 MMOL/L (ref 98–110)
CO2 SERPL-SCNC: 26 MMOL/L (ref 20–32)
CREAT SERPL-MCNC: 0.52 MG/DL (ref 0.6–1)
CRP SERPL-MCNC: NORMAL MG/L
EGFRCR SERPLBLD CKD-EPI 2021: 99 ML/MIN/1.73M2
EOSINOPHIL # BLD AUTO: 194 CELLS/UL (ref 15–500)
EOSINOPHIL NFR BLD AUTO: 5.4 %
ERYTHROCYTE [DISTWIDTH] IN BLOOD BY AUTOMATED COUNT: 14 % (ref 11–15)
GLUCOSE SERPL-MCNC: 106 MG/DL (ref 65–99)
HCT VFR BLD AUTO: 41.8 % (ref 35–45)
HGB BLD-MCNC: 13.9 G/DL (ref 11.7–15.5)
LYMPHOCYTES # BLD AUTO: 1004 CELLS/UL (ref 850–3900)
LYMPHOCYTES NFR BLD AUTO: 27.9 %
MCH RBC QN AUTO: 29.9 PG (ref 27–33)
MCHC RBC AUTO-ENTMCNC: 33.3 G/DL (ref 32–36)
MCV RBC AUTO: 89.9 FL (ref 80–100)
MONOCYTES # BLD AUTO: 346 CELLS/UL (ref 200–950)
MONOCYTES NFR BLD AUTO: 9.6 %
NEUTROPHILS # BLD AUTO: 2016 CELLS/UL (ref 1500–7800)
NEUTROPHILS NFR BLD AUTO: 56 %
PLATELET # BLD AUTO: 229 THOUSAND/UL (ref 140–400)
PMV BLD REES-ECKER: 11.6 FL (ref 7.5–12.5)
POTASSIUM SERPL-SCNC: 4.3 MMOL/L (ref 3.5–5.3)
PROT SERPL-MCNC: 6.5 G/DL (ref 6.1–8.1)
RBC # BLD AUTO: 4.65 MILLION/UL (ref 3.8–5.1)
SODIUM SERPL-SCNC: 140 MMOL/L (ref 135–146)
WBC # BLD AUTO: 3.6 THOUSAND/UL (ref 3.8–10.8)

## 2025-04-07 LAB
ALBUMIN SERPL-MCNC: 3.9 G/DL (ref 3.6–5.1)
ALP SERPL-CCNC: 72 U/L (ref 37–153)
ALT SERPL-CCNC: 14 U/L (ref 6–29)
ANION GAP SERPL CALCULATED.4IONS-SCNC: 7 MMOL/L (CALC) (ref 7–17)
AST SERPL-CCNC: 13 U/L (ref 10–35)
BASOPHILS # BLD AUTO: 40 CELLS/UL (ref 0–200)
BASOPHILS NFR BLD AUTO: 1.1 %
BILIRUB SERPL-MCNC: 0.5 MG/DL (ref 0.2–1.2)
BUN SERPL-MCNC: 14 MG/DL (ref 7–25)
CALCIUM SERPL-MCNC: 9.4 MG/DL (ref 8.6–10.4)
CHLORIDE SERPL-SCNC: 107 MMOL/L (ref 98–110)
CO2 SERPL-SCNC: 26 MMOL/L (ref 20–32)
CREAT SERPL-MCNC: 0.52 MG/DL (ref 0.6–1)
CRP SERPL-MCNC: <3 MG/L
EGFRCR SERPLBLD CKD-EPI 2021: 99 ML/MIN/1.73M2
EOSINOPHIL # BLD AUTO: 194 CELLS/UL (ref 15–500)
EOSINOPHIL NFR BLD AUTO: 5.4 %
ERYTHROCYTE [DISTWIDTH] IN BLOOD BY AUTOMATED COUNT: 14 % (ref 11–15)
GLUCOSE SERPL-MCNC: 106 MG/DL (ref 65–99)
HCT VFR BLD AUTO: 41.8 % (ref 35–45)
HGB BLD-MCNC: 13.9 G/DL (ref 11.7–15.5)
LYMPHOCYTES # BLD AUTO: 1004 CELLS/UL (ref 850–3900)
LYMPHOCYTES NFR BLD AUTO: 27.9 %
MCH RBC QN AUTO: 29.9 PG (ref 27–33)
MCHC RBC AUTO-ENTMCNC: 33.3 G/DL (ref 32–36)
MCV RBC AUTO: 89.9 FL (ref 80–100)
MONOCYTES # BLD AUTO: 346 CELLS/UL (ref 200–950)
MONOCYTES NFR BLD AUTO: 9.6 %
NEUTROPHILS # BLD AUTO: 2016 CELLS/UL (ref 1500–7800)
NEUTROPHILS NFR BLD AUTO: 56 %
PLATELET # BLD AUTO: 229 THOUSAND/UL (ref 140–400)
PMV BLD REES-ECKER: 11.6 FL (ref 7.5–12.5)
POTASSIUM SERPL-SCNC: 4.3 MMOL/L (ref 3.5–5.3)
PROT SERPL-MCNC: 6.5 G/DL (ref 6.1–8.1)
RBC # BLD AUTO: 4.65 MILLION/UL (ref 3.8–5.1)
SODIUM SERPL-SCNC: 140 MMOL/L (ref 135–146)
WBC # BLD AUTO: 3.6 THOUSAND/UL (ref 3.8–10.8)

## 2025-05-16 DIAGNOSIS — M06.9 RHEUMATOID ARTHRITIS INVOLVING MULTIPLE JOINTS (MULTI): ICD-10-CM

## 2025-06-03 LAB
ALBUMIN SERPL-MCNC: 4 G/DL (ref 3.6–5.1)
ALP SERPL-CCNC: 74 U/L (ref 37–153)
ALT SERPL-CCNC: 9 U/L (ref 6–29)
ANION GAP SERPL CALCULATED.4IONS-SCNC: 8 MMOL/L (CALC) (ref 7–17)
AST SERPL-CCNC: 10 U/L (ref 10–35)
BASOPHILS # BLD AUTO: 32 CELLS/UL (ref 0–200)
BASOPHILS NFR BLD AUTO: 0.9 %
BILIRUB SERPL-MCNC: 0.6 MG/DL (ref 0.2–1.2)
BUN SERPL-MCNC: 13 MG/DL (ref 7–25)
CALCIUM SERPL-MCNC: 9.5 MG/DL (ref 8.6–10.4)
CHLORIDE SERPL-SCNC: 107 MMOL/L (ref 98–110)
CO2 SERPL-SCNC: 25 MMOL/L (ref 20–32)
CREAT SERPL-MCNC: 0.54 MG/DL (ref 0.6–1)
CRP SERPL-MCNC: 7.7 MG/L
EGFRCR SERPLBLD CKD-EPI 2021: 98 ML/MIN/1.73M2
EOSINOPHIL # BLD AUTO: 119 CELLS/UL (ref 15–500)
EOSINOPHIL NFR BLD AUTO: 3.4 %
ERYTHROCYTE [DISTWIDTH] IN BLOOD BY AUTOMATED COUNT: 13.3 % (ref 11–15)
GLUCOSE SERPL-MCNC: 82 MG/DL (ref 65–99)
HCT VFR BLD AUTO: 43.5 % (ref 35–45)
HGB BLD-MCNC: 14.2 G/DL (ref 11.7–15.5)
LYMPHOCYTES # BLD AUTO: 977 CELLS/UL (ref 850–3900)
LYMPHOCYTES NFR BLD AUTO: 27.9 %
MCH RBC QN AUTO: 29.8 PG (ref 27–33)
MCHC RBC AUTO-ENTMCNC: 32.6 G/DL (ref 32–36)
MCV RBC AUTO: 91.2 FL (ref 80–100)
MONOCYTES # BLD AUTO: 333 CELLS/UL (ref 200–950)
MONOCYTES NFR BLD AUTO: 9.5 %
NEUTROPHILS # BLD AUTO: 2041 CELLS/UL (ref 1500–7800)
NEUTROPHILS NFR BLD AUTO: 58.3 %
PLATELET # BLD AUTO: 246 THOUSAND/UL (ref 140–400)
PMV BLD REES-ECKER: 11.5 FL (ref 7.5–12.5)
POTASSIUM SERPL-SCNC: 4.2 MMOL/L (ref 3.5–5.3)
PROT SERPL-MCNC: 6.7 G/DL (ref 6.1–8.1)
RBC # BLD AUTO: 4.77 MILLION/UL (ref 3.8–5.1)
SODIUM SERPL-SCNC: 140 MMOL/L (ref 135–146)
WBC # BLD AUTO: 3.5 THOUSAND/UL (ref 3.8–10.8)

## 2025-07-07 ENCOUNTER — APPOINTMENT (OUTPATIENT)
Dept: OPHTHALMOLOGY | Facility: CLINIC | Age: 70
End: 2025-07-07
Payer: MEDICARE

## 2025-07-10 ENCOUNTER — APPOINTMENT (OUTPATIENT)
Dept: RHEUMATOLOGY | Facility: CLINIC | Age: 70
End: 2025-07-10
Payer: MEDICARE

## 2025-07-10 VITALS
HEART RATE: 75 BPM | HEIGHT: 68 IN | WEIGHT: 137 LBS | DIASTOLIC BLOOD PRESSURE: 68 MMHG | TEMPERATURE: 97.2 F | SYSTOLIC BLOOD PRESSURE: 109 MMHG | BODY MASS INDEX: 20.76 KG/M2

## 2025-07-10 DIAGNOSIS — M06.049 RHEUMATOID ARTHRITIS INVOLVING HAND WITH NEGATIVE RHEUMATOID FACTOR, UNSPECIFIED LATERALITY (MULTI): ICD-10-CM

## 2025-07-10 PROCEDURE — 3008F BODY MASS INDEX DOCD: CPT | Performed by: INTERNAL MEDICINE

## 2025-07-10 PROCEDURE — 1036F TOBACCO NON-USER: CPT | Performed by: INTERNAL MEDICINE

## 2025-07-10 PROCEDURE — 1159F MED LIST DOCD IN RCRD: CPT | Performed by: INTERNAL MEDICINE

## 2025-07-10 PROCEDURE — 1160F RVW MEDS BY RX/DR IN RCRD: CPT | Performed by: INTERNAL MEDICINE

## 2025-07-10 PROCEDURE — 99214 OFFICE O/P EST MOD 30 MIN: CPT | Performed by: INTERNAL MEDICINE

## 2025-07-10 PROCEDURE — 1125F AMNT PAIN NOTED PAIN PRSNT: CPT | Performed by: INTERNAL MEDICINE

## 2025-07-10 RX ORDER — PREDNISONE 5 MG/1
TABLET ORAL
Qty: 60 TABLET | Refills: 3 | Status: SHIPPED | OUTPATIENT
Start: 2025-07-10

## 2025-07-10 RX ORDER — EYELID CLEANSER COMBINATION 1
FOAM (ML) TOPICAL AS NEEDED
COMMUNITY

## 2025-07-10 ASSESSMENT — ENCOUNTER SYMPTOMS: DEPRESSION: 0

## 2025-07-10 ASSESSMENT — PATIENT HEALTH QUESTIONNAIRE - PHQ9
SUM OF ALL RESPONSES TO PHQ9 QUESTIONS 1 & 2: 0
2. FEELING DOWN, DEPRESSED OR HOPELESS: NOT AT ALL
1. LITTLE INTEREST OR PLEASURE IN DOING THINGS: NOT AT ALL

## 2025-07-10 ASSESSMENT — PAIN SCALES - GENERAL: PAINLEVEL_OUTOF10: 2

## 2025-07-10 NOTE — PROGRESS NOTES
She has swelling in her hands in the morning.  She has noted numbness in the feet in the mornings that may extend up into the lower legs.  She has mild shortness breath with over exertion.  She takes Prednisone 5 mg 2 days per week and Xeljanz 5 mg 2 days per week.  She has a small rash on the proximal lateral aspect of the right calf since yesterday.    She has a thin body habitus.  The lungs are clear to auscultation.  The heart is regular in rhythm with normal heart sounds.  The abdomen is benign.  Extremities are without edema.  Musculoskeletal examination shows chronic synovial thickening at the MCP joint of the digits of both hands and both wrists.  There is markedly limited flexion of the PIP and DIP joints of the digits of both hands.  The elbows, shoulders, knees, and ankles are not swollen.  There is mild pain on passive abduction of the shoulders with positive impingement sign.    Laboratory (6/2/2025) WBC 3.5, hemoglobin 14.2, hematocrit 43.5, MCV 91.2, MCHC 32.6, platelets 246, BUN 13, creatinine 0.54, glucose 82, calcium 9.5, albumin 4.0, alkaline phosphatase 74, AST 10, ALT 9, CRP 7.7    She has history of osteoporosis, rheumatoid arthritis, mixed connective tissue disease, leukopenia, hepatic hemangioma, 13 mm cyst in the tail of the pancreas.    She is to increase prednisone to 5 mg daily for 10 days then reduce back to 5 mg 2 days/week.  Continue Xeljanz 5 mg twice per week.  She is to have laboratory for follow-up of Xeljanz therapy.  She is to return at the next available office appointment.

## 2025-07-11 DIAGNOSIS — M06.9 RHEUMATOID ARTHRITIS INVOLVING MULTIPLE JOINTS (MULTI): ICD-10-CM

## 2025-07-15 ENCOUNTER — OFFICE VISIT (OUTPATIENT)
Dept: CARDIOLOGY | Facility: HOSPITAL | Age: 70
End: 2025-07-15
Payer: MEDICARE

## 2025-07-15 VITALS
HEIGHT: 68 IN | BODY MASS INDEX: 20.07 KG/M2 | SYSTOLIC BLOOD PRESSURE: 105 MMHG | DIASTOLIC BLOOD PRESSURE: 68 MMHG | HEART RATE: 67 BPM | WEIGHT: 132.4 LBS | OXYGEN SATURATION: 98 %

## 2025-07-15 DIAGNOSIS — E78.00 PURE HYPERCHOLESTEROLEMIA: Primary | ICD-10-CM

## 2025-07-15 LAB
ATRIAL RATE: 67 BPM
P AXIS: 43 DEGREES
P OFFSET: 206 MS
P ONSET: 155 MS
PR INTERVAL: 140 MS
Q ONSET: 225 MS
QRS COUNT: 11 BEATS
QRS DURATION: 76 MS
QT INTERVAL: 398 MS
QTC CALCULATION(BAZETT): 420 MS
QTC FREDERICIA: 413 MS
R AXIS: 5 DEGREES
T AXIS: 24 DEGREES
T OFFSET: 424 MS
VENTRICULAR RATE: 67 BPM

## 2025-07-15 PROCEDURE — 99212 OFFICE O/P EST SF 10 MIN: CPT

## 2025-07-15 PROCEDURE — 1160F RVW MEDS BY RX/DR IN RCRD: CPT | Performed by: INTERNAL MEDICINE

## 2025-07-15 PROCEDURE — 1159F MED LIST DOCD IN RCRD: CPT | Performed by: INTERNAL MEDICINE

## 2025-07-15 PROCEDURE — 99213 OFFICE O/P EST LOW 20 MIN: CPT | Performed by: INTERNAL MEDICINE

## 2025-07-15 PROCEDURE — 93005 ELECTROCARDIOGRAM TRACING: CPT | Performed by: INTERNAL MEDICINE

## 2025-07-15 PROCEDURE — 1036F TOBACCO NON-USER: CPT | Performed by: INTERNAL MEDICINE

## 2025-07-15 PROCEDURE — 3008F BODY MASS INDEX DOCD: CPT | Performed by: INTERNAL MEDICINE

## 2025-07-15 PROCEDURE — G2211 COMPLEX E/M VISIT ADD ON: HCPCS | Performed by: INTERNAL MEDICINE

## 2025-07-15 NOTE — PROGRESS NOTES
Primary Care Physician: Danny Fraser MD  Date of Visit: 07/15/2025 11:20 AM EDT  Location of visit: [facility]     Chief Complaint:   Chief Complaint   Patient presents with    Follow-up        HPI / Summary:   Trang Ramos is a 70 y.o. female who presents for followup.  She has no complaints.  She does walk regularly in addition to doing lalo chi and yoga without chest pain or shortness of breath.  The patient denies chest pain, shortness of breath, palpitations, lightheadedness, syncope, orthopnea, paroxysmal nocturnal dyspnea, lower extremity edema, or bleeding problems.            Past Medical History:   Diagnosis Date    Dry eyes     Glaucoma suspect of both eyes     Nuclear sclerotic cataract of right eye     Personal history of other diseases of the musculoskeletal system and connective tissue 10/28/2013    History of connective tissue disease    Pseudophakia     Retinal detachment 01/01/2012    Retinal tear OS        Past Surgical History:   Procedure Laterality Date    CATARACT EXTRACTION      PC IOL OU    OTHER SURGICAL HISTORY  04/22/2019    Colonoscopy    RETINAL DETACHMENT SURGERY Left 01/01/2012    Retinal tear OS in 2012          Social History:   reports that she has never smoked. She has been exposed to tobacco smoke. She has never used smokeless tobacco. She reports that she does not drink alcohol and does not use drugs.     Family History:  family history includes No Known Problems in her father and mother.      Allergies:  Allergies   Allergen Reactions    Celecoxib Anaphylaxis and Swelling    Alendronic Acid GI Upset, Itching and Other     leg pain    Codeine GI Upset    Naproxen Swelling    Sulfa (Sulfonamide Antibiotics) Swelling    Leflunomide Dizziness    Hydroxychloroquine Rash    Minocycline Rash and Swelling       Outpatient Medications:  Current Outpatient Medications   Medication Instructions    calcium carbonate-vitamin D3 (Caltrate with Vitamin D3) 600 mg-20 mcg (800 unit)  "tablet 1 tablet, Every 24 hours    cholecalciferol (VITAMIN D-3) 1,000 Units, Daily    hypochlorous acid-sodium chlor (Thera Tears SteriLid) 0.01 % spray,non-aerosol As needed    hypromellose (GenTeal Tears Severe Gel) 0.3 % opthalmic gel 1 drop, As needed    multivitamin-Ca-iron-minerals (Tab-A-Jair Womens) 27-0.4 mg tablet 1 tablet, Daily    omega-3 acid ethyl esters (LOVAZA) 1 g, 2 times daily    predniSONE (Deltasone) 5 mg tablet 1 tablet orally daily for 10 days, then take 1 tablet orally three days per week.    tofacitinib (XELJANZ) 5 mg       Physical Exam:  Vitals:    07/15/25 1131   BP: 105/68   BP Location: Right arm   Pulse: 67   SpO2: 98%   Weight: 60.1 kg (132 lb 6.4 oz)   Height: 1.715 m (5' 7.5\")     Wt Readings from Last 5 Encounters:   07/15/25 60.1 kg (132 lb 6.4 oz)   07/10/25 62.1 kg (137 lb)   01/30/25 61.2 kg (135 lb)   01/24/25 61.2 kg (135 lb)   12/04/24 60.8 kg (134 lb)     Body mass index is 20.43 kg/m².   General: Well-developed well-nourished in no acute distress  HEENT: Normocephalic atraumatic  Neck: Supple, JVP is normal negative hepatojugular reflux 2+ carotid pulses without bruit  Pulmonary: Normal respiratory effort, clear to auscultation  Cardiovascular: No right ventricular heave, normal S1 and S2, no murmurs rubs or gallops  Abdomen: Soft nontender nondistended  Extremities: Warm without edema 2+ radial pulses bilaterally 2+ posterior tibial pulses bilaterally  Neurologic: Alert and oriented x3  Psychiatric: Normal mood and affect     Last Labs:  CMP:  Recent Labs     06/02/25  1015 04/05/25  0934 02/14/25  0956    140 140   K 4.2 4.3 4.2    107 107   CO2 25 26 24   ANIONGAP 8 7 9   BUN 13 14 13   CREATININE 0.54* 0.52* 0.57*   EGFR 98 99 97   GLUCOSE 82 106* 86     Recent Labs     06/02/25  1015 04/05/25  0934 02/14/25  0956   ALBUMIN 4.0 3.9 4.0   ALKPHOS 74 72 73   ALT 9 14 17   AST 10 13 14   BILITOT 0.6 0.5 0.4     CBC:  Recent Labs     06/02/25  1015 " "04/05/25  0934 02/14/25  0956   WBC 3.5* 3.6* 4.1   HGB 14.2 13.9 14.4   HCT 43.5 41.8 43.6    229 246   MCV 91.2 89.9 89.0     COAG: No results for input(s): \"INR\", \"DDIMERVTE\" in the last 96750 hours.  HEME/ENDO:  Recent Labs     08/30/18  1757   TSH 1.24      CARDIAC: No results for input(s): \"LDH\", \"CKMB\", \"TROPHS\", \"BNP\" in the last 94348 hours.    No lab exists for component: \"CK\", \"CKMBP\"  Recent Labs     07/16/24  1216 07/15/23  1000 07/05/22  0932   CHOL 153 152 154   LDLF  --  66 63   LDLCALC 60  --   --    HDL 82.9 76.8 81.8   TRIG 51 48 46       Last Cardiology Tests:  ECG:  An electrocardiogram performed today that I reviewed shows normal sinus rhythm.    Echo:  Echocardiogram March 13, 2019  CONCLUSIONS:   1. The left ventricular systolic function is normal with a 60-65% estimated ejection fraction.       Cath:      Stress Test:  Stress Results:  No results found for this or any previous visit from the past 365 days.         Cardiac Imaging:  CT cardiac scoring October 2024  FINDINGS:  The score and distribution of calcium in the coronary arteries is as  follows:      LM 0,  LAD 0,  LCx 0,  RCA 0,      Total 0      The visualized mid/lower ascending thoracic aorta measures 3.0 cm in  diameter. The heart is normal in size. No pericardial effusion is  present.    CT chest without contrast July 31, 2019  HEART AND VESSELS:  The thoracic aorta is of normal course and caliber without vascular  calcifications.     Main pulmonary artery and its branches are normal in caliber.     No coronary artery calcifications are seen. The study is not  optimized for evaluation of coronary arteries.     The cardiac chambers are not enlarged.     No evidence of pericardial effusion.      Assessment/Plan   Diagnoses and all orders for this visit:  Pure hypercholesterolemia  -     ECG 12 lead (Clinic Performed)    In summary Ms. Ramos is a pleasant 70 year-old -American female with a past medical history " significant for rheumatoid arthritis and hyperlipidemia with a CT cardiac score of 0 in 2024.  She is asymptomatic from a cardiac perspective.  Her blood pressure is well-controlled.  Her lipids are at goal given a CT cardiac score of 0.  She does not wish to take statins.  I encouraged her to increase her physical activity.  We will see her back in follow-up in 1 year.    Orders:  Orders Placed This Encounter   Procedures    ECG 12 lead (Clinic Performed)      Followup Appts:  Future Appointments   Date Time Provider Department Center   10/9/2025  9:00 AM Steve Mac MD [id number] Academic   1/8/2026  2:20 PM Victoriano Valverde MD [id number] Academic   7/15/2026 10:30 AM Migdalia Gramajo, APRN-CNP [id number] [facility]           ____________________________________________________________  Maurice Sahni MD  [facility]  [facility]

## 2025-07-29 LAB
ALBUMIN SERPL-MCNC: 3.9 G/DL (ref 3.6–5.1)
ALP SERPL-CCNC: 79 U/L (ref 37–153)
ALT SERPL-CCNC: 11 U/L (ref 6–29)
ANION GAP SERPL CALCULATED.4IONS-SCNC: 10 MMOL/L (CALC) (ref 7–17)
AST SERPL-CCNC: 10 U/L (ref 10–35)
BASOPHILS # BLD AUTO: 21 CELLS/UL (ref 0–200)
BASOPHILS NFR BLD AUTO: 0.5 %
BILIRUB SERPL-MCNC: 0.3 MG/DL (ref 0.2–1.2)
BUN SERPL-MCNC: 13 MG/DL (ref 7–25)
CALCIUM SERPL-MCNC: 9.5 MG/DL (ref 8.6–10.4)
CHLORIDE SERPL-SCNC: 109 MMOL/L (ref 98–110)
CO2 SERPL-SCNC: 22 MMOL/L (ref 20–32)
CREAT SERPL-MCNC: 0.55 MG/DL (ref 0.6–1)
CRP SERPL-MCNC: 3.7 MG/L
EGFRCR SERPLBLD CKD-EPI 2021: 98 ML/MIN/1.73M2
EOSINOPHIL # BLD AUTO: 147 CELLS/UL (ref 15–500)
EOSINOPHIL NFR BLD AUTO: 3.5 %
ERYTHROCYTE [DISTWIDTH] IN BLOOD BY AUTOMATED COUNT: 13.7 % (ref 11–15)
GLUCOSE SERPL-MCNC: 91 MG/DL (ref 65–99)
HCT VFR BLD AUTO: 43.6 % (ref 35–45)
HGB BLD-MCNC: 14.2 G/DL (ref 11.7–15.5)
LYMPHOCYTES # BLD AUTO: 1121 CELLS/UL (ref 850–3900)
LYMPHOCYTES NFR BLD AUTO: 26.7 %
MCH RBC QN AUTO: 29.6 PG (ref 27–33)
MCHC RBC AUTO-ENTMCNC: 32.6 G/DL (ref 32–36)
MCV RBC AUTO: 91 FL (ref 80–100)
MONOCYTES # BLD AUTO: 416 CELLS/UL (ref 200–950)
MONOCYTES NFR BLD AUTO: 9.9 %
NEUTROPHILS # BLD AUTO: 2495 CELLS/UL (ref 1500–7800)
NEUTROPHILS NFR BLD AUTO: 59.4 %
PLATELET # BLD AUTO: 209 THOUSAND/UL (ref 140–400)
PMV BLD REES-ECKER: 11.1 FL (ref 7.5–12.5)
POTASSIUM SERPL-SCNC: 4.1 MMOL/L (ref 3.5–5.3)
PROT SERPL-MCNC: 6.7 G/DL (ref 6.1–8.1)
RBC # BLD AUTO: 4.79 MILLION/UL (ref 3.8–5.1)
SODIUM SERPL-SCNC: 141 MMOL/L (ref 135–146)
WBC # BLD AUTO: 4.2 THOUSAND/UL (ref 3.8–10.8)

## 2025-08-07 ENCOUNTER — APPOINTMENT (OUTPATIENT)
Dept: OPHTHALMOLOGY | Facility: CLINIC | Age: 70
End: 2025-08-07
Payer: MEDICARE

## 2025-08-29 DIAGNOSIS — M06.049 RHEUMATOID ARTHRITIS INVOLVING HAND WITH NEGATIVE RHEUMATOID FACTOR, UNSPECIFIED LATERALITY (MULTI): ICD-10-CM

## 2025-10-09 ENCOUNTER — APPOINTMENT (OUTPATIENT)
Dept: OPHTHALMOLOGY | Facility: CLINIC | Age: 70
End: 2025-10-09
Payer: MEDICARE

## 2026-01-08 ENCOUNTER — APPOINTMENT (OUTPATIENT)
Dept: RHEUMATOLOGY | Facility: CLINIC | Age: 71
End: 2026-01-08
Payer: MEDICARE